# Patient Record
Sex: MALE | Race: WHITE | NOT HISPANIC OR LATINO | Employment: OTHER | URBAN - METROPOLITAN AREA
[De-identification: names, ages, dates, MRNs, and addresses within clinical notes are randomized per-mention and may not be internally consistent; named-entity substitution may affect disease eponyms.]

---

## 2018-07-26 ENCOUNTER — OFFICE VISIT (OUTPATIENT)
Dept: PRIMARY CARE CLINIC | Facility: CLINIC | Age: 38
End: 2018-07-26
Payer: MEDICAID

## 2018-07-26 ENCOUNTER — CLINICAL SUPPORT (OUTPATIENT)
Dept: PRIMARY CARE CLINIC | Facility: CLINIC | Age: 38
End: 2018-07-26
Payer: MEDICAID

## 2018-07-26 VITALS
BODY MASS INDEX: 30.82 KG/M2 | RESPIRATION RATE: 18 BRPM | TEMPERATURE: 98 F | OXYGEN SATURATION: 96 % | DIASTOLIC BLOOD PRESSURE: 77 MMHG | HEIGHT: 70 IN | WEIGHT: 215.31 LBS | HEART RATE: 72 BPM | SYSTOLIC BLOOD PRESSURE: 121 MMHG

## 2018-07-26 DIAGNOSIS — M25.522 LEFT ELBOW PAIN: Primary | ICD-10-CM

## 2018-07-26 DIAGNOSIS — Z13.220 LIPID SCREENING: ICD-10-CM

## 2018-07-26 DIAGNOSIS — M25.522 LEFT ELBOW PAIN: ICD-10-CM

## 2018-07-26 DIAGNOSIS — M10.9 ACUTE GOUT OF LEFT ELBOW, UNSPECIFIED CAUSE: ICD-10-CM

## 2018-07-26 LAB
ALBUMIN SERPL BCP-MCNC: 4.6 G/DL
ALP SERPL-CCNC: 58 U/L
ALT SERPL W/O P-5'-P-CCNC: 33 U/L
ANION GAP SERPL CALC-SCNC: 9 MMOL/L
AST SERPL-CCNC: 27 U/L
BASOPHILS # BLD AUTO: 0 K/UL
BASOPHILS NFR BLD: 0.5 %
BILIRUB SERPL-MCNC: 0.5 MG/DL
BUN SERPL-MCNC: 17 MG/DL
CALCIUM SERPL-MCNC: 9.1 MG/DL
CHLORIDE SERPL-SCNC: 99 MMOL/L
CHOLEST SERPL-MCNC: 170 MG/DL
CHOLEST/HDLC SERPL: 3.7 {RATIO}
CO2 SERPL-SCNC: 26 MMOL/L
CREAT SERPL-MCNC: 1 MG/DL
DIFFERENTIAL METHOD: ABNORMAL
EOSINOPHIL # BLD AUTO: 0.1 K/UL
EOSINOPHIL NFR BLD: 1.9 %
ERYTHROCYTE [DISTWIDTH] IN BLOOD BY AUTOMATED COUNT: 13 %
EST. GFR  (AFRICAN AMERICAN): >60 ML/MIN/1.73 M^2
EST. GFR  (NON AFRICAN AMERICAN): >60 ML/MIN/1.73 M^2
GLUCOSE SERPL-MCNC: 87 MG/DL
HCT VFR BLD AUTO: 46.2 %
HDLC SERPL-MCNC: 46 MG/DL
HDLC SERPL: 27.1 %
HGB BLD-MCNC: 15.7 G/DL
LDLC SERPL CALC-MCNC: 92 MG/DL
LYMPHOCYTES # BLD AUTO: 2.3 K/UL
LYMPHOCYTES NFR BLD: 33 %
MCH RBC QN AUTO: 28.4 PG
MCHC RBC AUTO-ENTMCNC: 34 G/DL
MCV RBC AUTO: 83 FL
MONOCYTES # BLD AUTO: 0.6 K/UL
MONOCYTES NFR BLD: 8.5 %
NEUTROPHILS # BLD AUTO: 3.9 K/UL
NEUTROPHILS NFR BLD: 56.1 %
NONHDLC SERPL-MCNC: 124 MG/DL
PLATELET # BLD AUTO: 263 K/UL
PMV BLD AUTO: 7.8 FL
POTASSIUM SERPL-SCNC: 3.9 MMOL/L
PROT SERPL-MCNC: 8.2 G/DL
RBC # BLD AUTO: 5.54 M/UL
SODIUM SERPL-SCNC: 134 MMOL/L
TRIGL SERPL-MCNC: 162 MG/DL
URATE SERPL-MCNC: 6.1 MG/DL
WBC # BLD AUTO: 6.9 K/UL

## 2018-07-26 PROCEDURE — 99212 OFFICE O/P EST SF 10 MIN: CPT | Mod: PBBFAC,27,PN

## 2018-07-26 PROCEDURE — 99999 PR PBB SHADOW E&M-EST. PATIENT-LVL II: CPT | Mod: PBBFAC,,,

## 2018-07-26 PROCEDURE — 99214 OFFICE O/P EST MOD 30 MIN: CPT | Mod: S$PBB,,, | Performed by: NURSE PRACTITIONER

## 2018-07-26 PROCEDURE — 99203 OFFICE O/P NEW LOW 30 MIN: CPT | Mod: PBBFAC,PN | Performed by: NURSE PRACTITIONER

## 2018-07-26 PROCEDURE — 99999 PR PBB SHADOW E&M-NEW PATIENT-LVL III: CPT | Mod: PBBFAC,,, | Performed by: NURSE PRACTITIONER

## 2018-07-26 RX ORDER — METHYLPREDNISOLONE 4 MG/1
TABLET ORAL
Qty: 1 PACKAGE | Refills: 0 | Status: SHIPPED | OUTPATIENT
Start: 2018-07-26 | End: 2018-09-25

## 2018-07-26 RX ORDER — INDOMETHACIN 25 MG/1
25 CAPSULE ORAL 3 TIMES DAILY
Qty: 28 CAPSULE | Refills: 0 | Status: SHIPPED | OUTPATIENT
Start: 2018-07-26 | End: 2018-09-25

## 2018-07-26 NOTE — PROGRESS NOTES
Chief Complaint  Chief Complaint   Patient presents with    Elbow Pain       HPI  Varun Graham Sr. is a 38 y.o. male with multiple medical diagnoses as listed in the medical history and problem list that presents for left elbow pain.    Patient reports onset of left elbow pain approximately 24 hr ago.  Describes elbow has tender, warm, erythematous, swollen.  Improving in pain and swelling over the last 24 hr.  Pain rated 10/10.  Increased pain with.  No difficulty with movement and range of motion.  Patient without a history of gout or joint abnormalities.  No associated trauma or fall.  Patient is in smoker.  No alcohol intake.  No medical history surgical history.  Not treating with any over-the-counter medications.  No recent lab work.  Patient due for lab work at this time.    PAST MEDICAL HISTORY:  History reviewed. No pertinent past medical history.    PAST SURGICAL HISTORY:  Past Surgical History:   Procedure Laterality Date    APPENDECTOMY         SOCIAL HISTORY:  Social History     Social History    Marital status:      Spouse name: N/A    Number of children: N/A    Years of education: N/A     Occupational History    Not on file.     Social History Main Topics    Smoking status: Current Every Day Smoker    Smokeless tobacco: Never Used    Alcohol use No    Drug use: No    Sexual activity: Yes     Other Topics Concern    Not on file     Social History Narrative    No narrative on file       FAMILY HISTORY:  Family History   Problem Relation Age of Onset    Family history unknown: Yes       ALLERGIES AND MEDICATIONS: updated and reviewed.  Review of patient's allergies indicates:  No Known Allergies  Current Outpatient Prescriptions   Medication Sig Dispense Refill    indomethacin (INDOCIN) 25 MG capsule Take 1 capsule (25 mg total) by mouth 3 (three) times daily. 28 capsule 0    methylPREDNISolone (MEDROL DOSEPACK) 4 mg tablet use as directed 1 Package 0     No current  "facility-administered medications for this visit.          ROS  Review of Systems   Constitutional: Negative for chills, fatigue and fever.   HENT: Negative for congestion, rhinorrhea, sinus pressure and sore throat.    Respiratory: Negative for cough, chest tightness and shortness of breath.    Cardiovascular: Negative for chest pain and palpitations.   Gastrointestinal: Negative for abdominal pain, blood in stool, diarrhea, nausea and vomiting.   Genitourinary: Negative for dysuria, frequency, hematuria and urgency.   Musculoskeletal: Positive for arthralgias and joint swelling. Negative for back pain.   Skin: Negative for rash and wound.   Neurological: Negative for dizziness and headaches.   Psychiatric/Behavioral: Negative for dysphoric mood and sleep disturbance. The patient is not nervous/anxious.          PHYSICAL EXAM  Vitals:    07/26/18 1323   BP: 121/77   BP Location: Right arm   Patient Position: Sitting   BP Method: Medium (Automatic)   Pulse: 72   Resp: 18   Temp: 98.2 °F (36.8 °C)   TempSrc: Oral   SpO2: 96%   Weight: 97.7 kg (215 lb 4.8 oz)   Height: 5' 10" (1.778 m)    Body mass index is 30.89 kg/m².  Weight: 97.7 kg (215 lb 4.8 oz)   Height: 5' 10" (177.8 cm)     Physical Exam   Constitutional: He is oriented to person, place, and time. He appears well-developed and well-nourished.   HENT:   Head: Normocephalic.   Right Ear: Tympanic membrane normal.   Left Ear: Tympanic membrane normal.   Mouth/Throat: Uvula is midline, oropharynx is clear and moist and mucous membranes are normal.   Eyes: Conjunctivae are normal.   Cardiovascular: Normal rate, regular rhythm, normal heart sounds and normal pulses.    No murmur heard.  Pulses:       Radial pulses are 2+ on the right side, and 2+ on the left side.   No LE swelling noted   Pulmonary/Chest: Effort normal and breath sounds normal. He has no wheezes.   Abdominal: Soft. Bowel sounds are normal. There is no tenderness.   Musculoskeletal: He exhibits no " edema.        Arms:  Lymphadenopathy:     He has no cervical adenopathy.   Neurological: He is alert and oriented to person, place, and time.   Skin: Skin is warm and dry. No rash noted.   Psychiatric: He has a normal mood and affect.         Health Maintenance    Patient has no pending health maintenance at this time           Assessment & Plan    Varun was seen today for elbow pain.    Diagnoses and all orders for this visit:    Left elbow pain  -     Uric acid; Future    Acute gout of left elbow, unspecified cause  -     CBC auto differential; Future  -     Comprehensive metabolic panel; Future    Lipid screening  -     Lipid panel; Future    Other orders  -     methylPREDNISolone (MEDROL DOSEPACK) 4 mg tablet; use as directed  -     indomethacin (INDOCIN) 25 MG capsule; Take 1 capsule (25 mg total) by mouth 3 (three) times daily.        Follow-up: Follow-up in about 3 months (around 10/26/2018).

## 2018-08-07 ENCOUNTER — TELEPHONE (OUTPATIENT)
Dept: PRIMARY CARE CLINIC | Facility: CLINIC | Age: 38
End: 2018-08-07

## 2018-08-07 NOTE — TELEPHONE ENCOUNTER
----- Message from Cinthia Marley sent at 8/7/2018  2:16 PM CDT -----  Test result.  Please call patient at [phone].

## 2018-09-25 ENCOUNTER — OFFICE VISIT (OUTPATIENT)
Dept: PRIMARY CARE CLINIC | Facility: CLINIC | Age: 38
End: 2018-09-25
Payer: MEDICAID

## 2018-09-25 VITALS
RESPIRATION RATE: 18 BRPM | BODY MASS INDEX: 30.8 KG/M2 | WEIGHT: 215.13 LBS | SYSTOLIC BLOOD PRESSURE: 124 MMHG | TEMPERATURE: 99 F | HEIGHT: 70 IN | DIASTOLIC BLOOD PRESSURE: 78 MMHG | HEART RATE: 82 BPM | OXYGEN SATURATION: 98 %

## 2018-09-25 DIAGNOSIS — L73.8 FOLLICULITIS BARBAE: Primary | ICD-10-CM

## 2018-09-25 PROCEDURE — 99214 OFFICE O/P EST MOD 30 MIN: CPT | Mod: S$PBB,,, | Performed by: NURSE PRACTITIONER

## 2018-09-25 PROCEDURE — 99999 PR PBB SHADOW E&M-EST. PATIENT-LVL III: CPT | Mod: PBBFAC,,, | Performed by: NURSE PRACTITIONER

## 2018-09-25 PROCEDURE — 99213 OFFICE O/P EST LOW 20 MIN: CPT | Mod: PBBFAC,PN | Performed by: NURSE PRACTITIONER

## 2018-09-25 RX ORDER — SULFAMETHOXAZOLE AND TRIMETHOPRIM 800; 160 MG/1; MG/1
1 TABLET ORAL 2 TIMES DAILY
Qty: 14 TABLET | Refills: 0 | Status: SHIPPED | OUTPATIENT
Start: 2018-09-25 | End: 2018-10-02

## 2018-09-25 NOTE — PROGRESS NOTES
Chief Complaint  Chief Complaint   Patient presents with    Sore Throat    Otalgia     right ear with lump       HPI  Varun Graham Sr. is a 38 y.o. male with multiple medical diagnoses as listed in the medical history and problem list that presents for right ear pain.    Patient reports that he was shaving approximately 7 days ago when he cut him off of his face using a razor.  Noted right facial pain, swelling immediately after.  Associated right ear pain. Noted swelling and Knot noted to his right face.  Describes tender, painful, increasing in size.  No fever or chills.  Not currently taking any over-the-counter medications for relief.      PAST MEDICAL HISTORY:  History reviewed. No pertinent past medical history.    PAST SURGICAL HISTORY:  Past Surgical History:   Procedure Laterality Date    APPENDECTOMY         SOCIAL HISTORY:  Social History     Socioeconomic History    Marital status:      Spouse name: Not on file    Number of children: Not on file    Years of education: Not on file    Highest education level: Not on file   Social Needs    Financial resource strain: Not on file    Food insecurity - worry: Not on file    Food insecurity - inability: Not on file    Transportation needs - medical: Not on file    Transportation needs - non-medical: Not on file   Occupational History    Not on file   Tobacco Use    Smoking status: Current Every Day Smoker     Packs/day: 1.00     Types: Cigarettes    Smokeless tobacco: Never Used   Substance and Sexual Activity    Alcohol use: No    Drug use: No    Sexual activity: Yes   Other Topics Concern    Not on file   Social History Narrative    Not on file       FAMILY HISTORY:  Family History   Family history unknown: Yes       ALLERGIES AND MEDICATIONS: updated and reviewed.  Review of patient's allergies indicates:  No Known Allergies  Current Outpatient Medications   Medication Sig Dispense Refill    sulfamethoxazole-trimethoprim  "800-160mg (BACTRIM DS) 800-160 mg Tab Take 1 tablet by mouth 2 (two) times daily. for 7 days 14 tablet 0     No current facility-administered medications for this visit.          ROS  Review of Systems   Constitutional: Negative for chills, fatigue and fever.   HENT: Positive for ear pain and facial swelling. Negative for congestion, rhinorrhea, sinus pressure and sore throat.    Respiratory: Negative for cough, chest tightness and shortness of breath.    Cardiovascular: Negative for chest pain and palpitations.   Gastrointestinal: Negative for abdominal pain, blood in stool, diarrhea, nausea and vomiting.   Genitourinary: Negative for dysuria, frequency, hematuria and urgency.   Musculoskeletal: Negative for arthralgias and back pain.   Skin: Positive for wound. Negative for rash.   Neurological: Negative for dizziness and headaches.   Psychiatric/Behavioral: Negative for dysphoric mood and sleep disturbance. The patient is not nervous/anxious.          PHYSICAL EXAM  Vitals:    09/25/18 1019   BP: 124/78   BP Location: Right arm   Patient Position: Sitting   BP Method: Large (Automatic)   Pulse: 82   Resp: 18   Temp: 98.6 °F (37 °C)   TempSrc: Oral   SpO2: 98%   Weight: 97.6 kg (215 lb 1.6 oz)   Height: 5' 10" (1.778 m)    Body mass index is 30.86 kg/m².  Weight: 97.6 kg (215 lb 1.6 oz)   Height: 5' 10" (177.8 cm)     Physical Exam   Constitutional: He is oriented to person, place, and time. He appears well-developed and well-nourished.   HENT:   Head: Normocephalic.       Right Ear: Tympanic membrane normal.   Left Ear: Tympanic membrane normal.   Mouth/Throat: Uvula is midline, oropharynx is clear and moist and mucous membranes are normal.   Eyes: Conjunctivae are normal.   Cardiovascular: Normal rate, regular rhythm, normal heart sounds and normal pulses.   No murmur heard.  Pulses:       Radial pulses are 2+ on the right side, and 2+ on the left side.   No LE swelling noted   Pulmonary/Chest: Effort normal and " breath sounds normal. He has no wheezes.   Abdominal: Soft. Bowel sounds are normal. There is no tenderness.   Musculoskeletal: He exhibits no edema.   Lymphadenopathy:     He has no cervical adenopathy.   Neurological: He is alert and oriented to person, place, and time.   Skin: Skin is warm and dry. No rash noted.   Psychiatric: He has a normal mood and affect.         Health Maintenance       Date Due Completion Date    TETANUS VACCINE 01/14/1996 ---    Pneumococcal PPSV23 (Medium Risk) (1) 01/14/1996 ---    Influenza Vaccine 08/01/2018 ---    Lipid Panel 07/26/2023 7/26/2018            Assessment & Plan    Varun was seen today for sore throat and otalgia.    Diagnoses and all orders for this visit:    Folliculitis barbae  -     sulfamethoxazole-trimethoprim 800-160mg (BACTRIM DS) 800-160 mg Tab; Take 1 tablet by mouth 2 (two) times daily. for 7 days        Follow-up: Follow-up if symptoms worsen or fail to improve.

## 2021-04-01 ENCOUNTER — OCCUPATIONAL HEALTH (OUTPATIENT)
Dept: URGENT CARE | Facility: CLINIC | Age: 41
End: 2021-04-01

## 2021-04-01 PROCEDURE — 80305 DRUG TEST PRSMV DIR OPT OBS: CPT | Mod: S$GLB,,, | Performed by: EMERGENCY MEDICINE

## 2021-04-01 PROCEDURE — 80305 PR NON-DOT DRUG SCREENS: ICD-10-PCS | Mod: S$GLB,,, | Performed by: EMERGENCY MEDICINE

## 2021-12-15 ENCOUNTER — OFFICE VISIT (OUTPATIENT)
Dept: PRIMARY CARE CLINIC | Facility: CLINIC | Age: 41
End: 2021-12-15
Payer: COMMERCIAL

## 2021-12-15 VITALS
RESPIRATION RATE: 16 BRPM | HEIGHT: 70 IN | TEMPERATURE: 98 F | OXYGEN SATURATION: 97 % | HEART RATE: 75 BPM | SYSTOLIC BLOOD PRESSURE: 144 MMHG | DIASTOLIC BLOOD PRESSURE: 100 MMHG | BODY MASS INDEX: 30.27 KG/M2 | WEIGHT: 211.44 LBS

## 2021-12-15 DIAGNOSIS — Z11.4 ENCOUNTER FOR SCREENING FOR HIV: ICD-10-CM

## 2021-12-15 DIAGNOSIS — M54.16 LUMBAR BACK PAIN WITH RADICULOPATHY AFFECTING LEFT LOWER EXTREMITY: ICD-10-CM

## 2021-12-15 DIAGNOSIS — Z76.89 ENCOUNTER TO ESTABLISH CARE: Primary | ICD-10-CM

## 2021-12-15 DIAGNOSIS — Z11.59 NEED FOR HEPATITIS C SCREENING TEST: ICD-10-CM

## 2021-12-15 DIAGNOSIS — R03.0 ELEVATED BLOOD PRESSURE READING: ICD-10-CM

## 2021-12-15 DIAGNOSIS — Z13.6 SCREENING FOR CARDIOVASCULAR CONDITION: ICD-10-CM

## 2021-12-15 PROCEDURE — 99204 OFFICE O/P NEW MOD 45 MIN: CPT | Mod: S$GLB,,, | Performed by: STUDENT IN AN ORGANIZED HEALTH CARE EDUCATION/TRAINING PROGRAM

## 2021-12-15 PROCEDURE — 99999 PR PBB SHADOW E&M-EST. PATIENT-LVL IV: ICD-10-PCS | Mod: PBBFAC,,, | Performed by: STUDENT IN AN ORGANIZED HEALTH CARE EDUCATION/TRAINING PROGRAM

## 2021-12-15 PROCEDURE — 99999 PR PBB SHADOW E&M-EST. PATIENT-LVL IV: CPT | Mod: PBBFAC,,, | Performed by: STUDENT IN AN ORGANIZED HEALTH CARE EDUCATION/TRAINING PROGRAM

## 2021-12-15 PROCEDURE — 99204 PR OFFICE/OUTPT VISIT, NEW, LEVL IV, 45-59 MIN: ICD-10-PCS | Mod: S$GLB,,, | Performed by: STUDENT IN AN ORGANIZED HEALTH CARE EDUCATION/TRAINING PROGRAM

## 2021-12-15 RX ORDER — MELOXICAM 15 MG/1
15 TABLET ORAL DAILY
Qty: 30 TABLET | Refills: 1 | Status: ON HOLD | OUTPATIENT
Start: 2021-12-15 | End: 2022-01-27 | Stop reason: HOSPADM

## 2021-12-15 RX ORDER — PREDNISONE 20 MG/1
20 TABLET ORAL DAILY
Qty: 5 TABLET | Refills: 0 | Status: ON HOLD | OUTPATIENT
Start: 2021-12-15 | End: 2022-01-27 | Stop reason: HOSPADM

## 2021-12-15 RX ORDER — CYCLOBENZAPRINE HCL 5 MG
5-10 TABLET ORAL 3 TIMES DAILY PRN
Qty: 40 TABLET | Refills: 0 | Status: SHIPPED | OUTPATIENT
Start: 2021-12-15 | End: 2021-12-25

## 2021-12-15 RX ORDER — HYDROCODONE BITARTRATE AND ACETAMINOPHEN 5; 325 MG/1; MG/1
1 TABLET ORAL EVERY 8 HOURS PRN
Qty: 15 TABLET | Refills: 0 | Status: ON HOLD | OUTPATIENT
Start: 2021-12-15 | End: 2022-01-27 | Stop reason: HOSPADM

## 2021-12-22 ENCOUNTER — OFFICE VISIT (OUTPATIENT)
Dept: PRIMARY CARE CLINIC | Facility: CLINIC | Age: 41
End: 2021-12-22
Payer: COMMERCIAL

## 2021-12-22 VITALS
SYSTOLIC BLOOD PRESSURE: 174 MMHG | DIASTOLIC BLOOD PRESSURE: 101 MMHG | BODY MASS INDEX: 30.98 KG/M2 | WEIGHT: 216.38 LBS | HEART RATE: 97 BPM | OXYGEN SATURATION: 99 % | RESPIRATION RATE: 18 BRPM | HEIGHT: 70 IN

## 2021-12-22 DIAGNOSIS — M54.16 LUMBAR RADICULOPATHY: Primary | ICD-10-CM

## 2021-12-22 PROCEDURE — 3077F PR MOST RECENT SYSTOLIC BLOOD PRESSURE >= 140 MM HG: ICD-10-PCS | Mod: CPTII,S$GLB,, | Performed by: NURSE PRACTITIONER

## 2021-12-22 PROCEDURE — 99214 PR OFFICE/OUTPT VISIT, EST, LEVL IV, 30-39 MIN: ICD-10-PCS | Mod: 25,S$GLB,, | Performed by: NURSE PRACTITIONER

## 2021-12-22 PROCEDURE — 99999 PR PBB SHADOW E&M-EST. PATIENT-LVL IV: CPT | Mod: PBBFAC,,, | Performed by: NURSE PRACTITIONER

## 2021-12-22 PROCEDURE — 3008F PR BODY MASS INDEX (BMI) DOCUMENTED: ICD-10-PCS | Mod: CPTII,S$GLB,, | Performed by: NURSE PRACTITIONER

## 2021-12-22 PROCEDURE — 3077F SYST BP >= 140 MM HG: CPT | Mod: CPTII,S$GLB,, | Performed by: NURSE PRACTITIONER

## 2021-12-22 PROCEDURE — 96372 PR INJECTION,THERAP/PROPH/DIAG2ST, IM OR SUBCUT: ICD-10-PCS | Mod: S$GLB,,, | Performed by: NURSE PRACTITIONER

## 2021-12-22 PROCEDURE — 99999 PR PBB SHADOW E&M-EST. PATIENT-LVL IV: ICD-10-PCS | Mod: PBBFAC,,, | Performed by: NURSE PRACTITIONER

## 2021-12-22 PROCEDURE — 1160F RVW MEDS BY RX/DR IN RCRD: CPT | Mod: CPTII,S$GLB,, | Performed by: NURSE PRACTITIONER

## 2021-12-22 PROCEDURE — 3080F DIAST BP >= 90 MM HG: CPT | Mod: CPTII,S$GLB,, | Performed by: NURSE PRACTITIONER

## 2021-12-22 PROCEDURE — 1159F MED LIST DOCD IN RCRD: CPT | Mod: CPTII,S$GLB,, | Performed by: NURSE PRACTITIONER

## 2021-12-22 PROCEDURE — 3008F BODY MASS INDEX DOCD: CPT | Mod: CPTII,S$GLB,, | Performed by: NURSE PRACTITIONER

## 2021-12-22 PROCEDURE — 1159F PR MEDICATION LIST DOCUMENTED IN MEDICAL RECORD: ICD-10-PCS | Mod: CPTII,S$GLB,, | Performed by: NURSE PRACTITIONER

## 2021-12-22 PROCEDURE — 1160F PR REVIEW ALL MEDS BY PRESCRIBER/CLIN PHARMACIST DOCUMENTED: ICD-10-PCS | Mod: CPTII,S$GLB,, | Performed by: NURSE PRACTITIONER

## 2021-12-22 PROCEDURE — 3080F PR MOST RECENT DIASTOLIC BLOOD PRESSURE >= 90 MM HG: ICD-10-PCS | Mod: CPTII,S$GLB,, | Performed by: NURSE PRACTITIONER

## 2021-12-22 PROCEDURE — 99214 OFFICE O/P EST MOD 30 MIN: CPT | Mod: 25,S$GLB,, | Performed by: NURSE PRACTITIONER

## 2021-12-22 PROCEDURE — 96372 THER/PROPH/DIAG INJ SC/IM: CPT | Mod: S$GLB,,, | Performed by: NURSE PRACTITIONER

## 2021-12-22 RX ORDER — METHOCARBAMOL 500 MG/1
500 TABLET, FILM COATED ORAL 4 TIMES DAILY
Qty: 40 TABLET | Refills: 0 | Status: SHIPPED | OUTPATIENT
Start: 2021-12-22 | End: 2022-01-01

## 2021-12-22 RX ORDER — BETAMETHASONE SODIUM PHOSPHATE AND BETAMETHASONE ACETATE 3; 3 MG/ML; MG/ML
12 INJECTION, SUSPENSION INTRA-ARTICULAR; INTRALESIONAL; INTRAMUSCULAR; SOFT TISSUE
Status: COMPLETED | OUTPATIENT
Start: 2021-12-22 | End: 2021-12-22

## 2021-12-22 RX ORDER — GABAPENTIN 300 MG/1
300 CAPSULE ORAL 3 TIMES DAILY
Qty: 30 CAPSULE | Refills: 1 | Status: ON HOLD | OUTPATIENT
Start: 2021-12-22 | End: 2022-01-27 | Stop reason: SDUPTHER

## 2021-12-22 RX ADMIN — BETAMETHASONE SODIUM PHOSPHATE AND BETAMETHASONE ACETATE 12 MG: 3; 3 INJECTION, SUSPENSION INTRA-ARTICULAR; INTRALESIONAL; INTRAMUSCULAR; SOFT TISSUE at 10:12

## 2021-12-27 ENCOUNTER — PATIENT MESSAGE (OUTPATIENT)
Dept: PRIMARY CARE CLINIC | Facility: CLINIC | Age: 41
End: 2021-12-27

## 2021-12-27 ENCOUNTER — TELEPHONE (OUTPATIENT)
Dept: PRIMARY CARE CLINIC | Facility: CLINIC | Age: 41
End: 2021-12-27
Payer: COMMERCIAL

## 2021-12-27 ENCOUNTER — CLINICAL SUPPORT (OUTPATIENT)
Dept: PRIMARY CARE CLINIC | Facility: CLINIC | Age: 41
End: 2021-12-27
Payer: COMMERCIAL

## 2021-12-27 ENCOUNTER — NURSE TRIAGE (OUTPATIENT)
Dept: ADMINISTRATIVE | Facility: CLINIC | Age: 41
End: 2021-12-27
Payer: COMMERCIAL

## 2021-12-27 VITALS — SYSTOLIC BLOOD PRESSURE: 182 MMHG | HEART RATE: 123 BPM | OXYGEN SATURATION: 98 % | DIASTOLIC BLOOD PRESSURE: 104 MMHG

## 2021-12-27 DIAGNOSIS — I16.1 HYPERTENSIVE EMERGENCY: Primary | ICD-10-CM

## 2021-12-27 PROCEDURE — 99999 PR PBB SHADOW E&M-EST. PATIENT-LVL II: ICD-10-PCS | Mod: PBBFAC,,,

## 2021-12-27 PROCEDURE — 99999 PR PBB SHADOW E&M-EST. PATIENT-LVL II: CPT | Mod: PBBFAC,,,

## 2021-12-30 ENCOUNTER — PATIENT OUTREACH (OUTPATIENT)
Dept: ADMINISTRATIVE | Facility: OTHER | Age: 41
End: 2021-12-30
Payer: COMMERCIAL

## 2022-01-03 ENCOUNTER — OFFICE VISIT (OUTPATIENT)
Dept: SPINE | Facility: CLINIC | Age: 42
End: 2022-01-03
Payer: COMMERCIAL

## 2022-01-03 VITALS
BODY MASS INDEX: 29.95 KG/M2 | HEART RATE: 90 BPM | SYSTOLIC BLOOD PRESSURE: 173 MMHG | DIASTOLIC BLOOD PRESSURE: 92 MMHG | WEIGHT: 209.19 LBS | HEIGHT: 70 IN

## 2022-01-03 DIAGNOSIS — M54.16 LUMBAR RADICULOPATHY: ICD-10-CM

## 2022-01-03 DIAGNOSIS — M54.42 ACUTE LEFT-SIDED LOW BACK PAIN WITH LEFT-SIDED SCIATICA: ICD-10-CM

## 2022-01-03 DIAGNOSIS — M47.26 OTHER SPONDYLOSIS WITH RADICULOPATHY, LUMBAR REGION: Primary | ICD-10-CM

## 2022-01-03 PROCEDURE — 1160F PR REVIEW ALL MEDS BY PRESCRIBER/CLIN PHARMACIST DOCUMENTED: ICD-10-PCS | Mod: CPTII,S$GLB,, | Performed by: NURSE PRACTITIONER

## 2022-01-03 PROCEDURE — 1159F PR MEDICATION LIST DOCUMENTED IN MEDICAL RECORD: ICD-10-PCS | Mod: CPTII,S$GLB,, | Performed by: NURSE PRACTITIONER

## 2022-01-03 PROCEDURE — 99204 PR OFFICE/OUTPT VISIT, NEW, LEVL IV, 45-59 MIN: ICD-10-PCS | Mod: S$GLB,,, | Performed by: NURSE PRACTITIONER

## 2022-01-03 PROCEDURE — 99999 PR PBB SHADOW E&M-EST. PATIENT-LVL IV: ICD-10-PCS | Mod: PBBFAC,,, | Performed by: NURSE PRACTITIONER

## 2022-01-03 PROCEDURE — 3077F PR MOST RECENT SYSTOLIC BLOOD PRESSURE >= 140 MM HG: ICD-10-PCS | Mod: CPTII,S$GLB,, | Performed by: NURSE PRACTITIONER

## 2022-01-03 PROCEDURE — 1159F MED LIST DOCD IN RCRD: CPT | Mod: CPTII,S$GLB,, | Performed by: NURSE PRACTITIONER

## 2022-01-03 PROCEDURE — 99204 OFFICE O/P NEW MOD 45 MIN: CPT | Mod: S$GLB,,, | Performed by: NURSE PRACTITIONER

## 2022-01-03 PROCEDURE — 1160F RVW MEDS BY RX/DR IN RCRD: CPT | Mod: CPTII,S$GLB,, | Performed by: NURSE PRACTITIONER

## 2022-01-03 PROCEDURE — 3080F DIAST BP >= 90 MM HG: CPT | Mod: CPTII,S$GLB,, | Performed by: NURSE PRACTITIONER

## 2022-01-03 PROCEDURE — 3008F BODY MASS INDEX DOCD: CPT | Mod: CPTII,S$GLB,, | Performed by: NURSE PRACTITIONER

## 2022-01-03 PROCEDURE — 3080F PR MOST RECENT DIASTOLIC BLOOD PRESSURE >= 90 MM HG: ICD-10-PCS | Mod: CPTII,S$GLB,, | Performed by: NURSE PRACTITIONER

## 2022-01-03 PROCEDURE — 3008F PR BODY MASS INDEX (BMI) DOCUMENTED: ICD-10-PCS | Mod: CPTII,S$GLB,, | Performed by: NURSE PRACTITIONER

## 2022-01-03 PROCEDURE — 3077F SYST BP >= 140 MM HG: CPT | Mod: CPTII,S$GLB,, | Performed by: NURSE PRACTITIONER

## 2022-01-03 PROCEDURE — 99999 PR PBB SHADOW E&M-EST. PATIENT-LVL IV: CPT | Mod: PBBFAC,,, | Performed by: NURSE PRACTITIONER

## 2022-01-03 NOTE — PROGRESS NOTES
Subjective:      Patient ID: Varun Graham Sr. is a 41 y.o. male.    Chief Complaint: Tailbone Pain    HPI Mr. Graham is a 41 year old male here for evaluation of low back pain. He was referred by Gudelia Correa NP for consultation. Patient reports his back pain started around Thanksgiving when he pulled on the kitchen table. The pain has been constant over the left low back and radiates down the posterior LLE to the ankle. He reports numbness and tingling in the left foot. He reports weakness in the left leg. The pain worsens with sitting and improves with nothing. He has tried gabapentin, robaxin, zanaflex, percocet and prednisone with limited benefit. He reports similar pain in the past, but states it has never been this severe. He has not done PT for his back in the past. The current episode is severe and affecting his functional activities. He denies loss of bladder/bowel function or saddle anesthesia. Pain now 8/10.     Lumbar xray 2021    FINDINGS:  Three views lumbar spine.     Lateral imaging demonstrates adequate alignment of the lumbar spine without significant vertebral body height loss or disc space height loss.  The sacral segments are aligned.  AP spinal alignment is remarkable for dextroscoliotic curvature.  The bilateral sacroiliac joints are intact.     Impression:     1. No acute displaced fracture or dislocation of the lumbar spine.    No past medical history on file.    Past Surgical History:   Procedure Laterality Date    APPENDECTOMY  1996       Family History   Problem Relation Age of Onset    Diabetes Father     Heart attack Paternal Grandfather 67    Stroke Neg Hx     Colon cancer Neg Hx     Lung cancer Neg Hx     Prostate cancer Neg Hx        Social History     Socioeconomic History    Marital status:    Tobacco Use    Smoking status: Current Every Day Smoker     Packs/day: 1.00     Types: Cigarettes    Smokeless tobacco: Never Used   Substance and Sexual Activity     Alcohol use: No    Drug use: No    Sexual activity: Yes     Partners: Female       Current Outpatient Medications   Medication Sig Dispense Refill    amLODIPine (NORVASC) 5 MG tablet Take 0.5 tablets (2.5 mg total) by mouth once daily. 15 tablet 0    gabapentin (NEURONTIN) 300 MG capsule Take 1 capsule (300 mg total) by mouth 3 (three) times daily. 30 capsule 1    HYDROcodone-acetaminophen (NORCO) 5-325 mg per tablet Take 1 tablet by mouth every 8 (eight) hours as needed (breakthrough pain). 15 tablet 0    ketorolac (TORADOL) 10 mg tablet Take 1 tablet (10 mg total) by mouth every 6 (six) hours as needed for Pain. Do not take with other NSAIDs such as meloxicam, ibuprofen, or naproxen 12 tablet 0    LIDOcaine (LIDODERM) 5 % Place 1 patch onto the skin once daily. Remove & Discard patch within 12 hours or as directed by MD 30 patch 0    meloxicam (MOBIC) 15 MG tablet Take 1 tablet (15 mg total) by mouth once daily. 30 tablet 1    oxyCODONE-acetaminophen (PERCOCET) 5-325 mg per tablet Take 1 tablet by mouth every 4 (four) hours as needed for Pain. 15 tablet 0    predniSONE (DELTASONE) 20 MG tablet Take 1 tablet (20 mg total) by mouth once daily. 5 tablet 0     No current facility-administered medications for this visit.       Review of patient's allergies indicates:  No Known Allergies      Review of Systems   Constitutional: Negative for fever, weight gain and weight loss.   Cardiovascular: Negative for chest pain.   Respiratory: Positive for shortness of breath (  when pain is severe).    Musculoskeletal: Positive for back pain (  left low back and left leg). Negative for falls.   Gastrointestinal: Negative for abdominal pain, bowel incontinence, nausea and vomiting.   Genitourinary: Negative for bladder incontinence.   Neurological: Positive for focal weakness (  LLE), numbness (  left foot) and paresthesias (  left foot). Negative for sensory change and weakness.         Objective:        General: Varun  is well-developed, well-nourished, appears stated age, in no acute distress, alert and oriented to time, place and person.     General    Vitals reviewed.  Constitutional: He is oriented to person, place, and time. He appears well-developed and well-nourished.   HENT:   Head: Atraumatic.   Nose: Nose normal.   Eyes: Conjunctivae are normal.   Cardiovascular: Normal rate.    Pulmonary/Chest: Effort normal.   Abdominal: He exhibits no distension.   Neurological: He is alert and oriented to person, place, and time.   Psychiatric: He has a normal mood and affect. His behavior is normal. Judgment and thought content normal.     General Musculoskeletal Exam   Gait: abnormal     Right Ankle/Foot Exam     Tests   Heel Walk: able to perform  Tiptoe Walk: able to perform    Left Ankle/Foot Exam     Tests   Heel Walk: able to perform  Tiptoe Walk: able to perform      Right Hip Exam     Tests   Pain w/ forced internal rotation (BRENNON): present (  left low back pain)  Left Hip Exam     Tests   Pain w/ forced internal rotation (BRENNON): present (  left low back pain)      Back (L-Spine & T-Spine) / Neck (C-Spine) Exam     Tenderness Left paramedian tenderness of the Lower L-Spine and Sacrum.     Back (L-Spine & T-Spine) Range of Motion   Extension: 30 (with pain)   Flexion: 70 (with pain)   Lateral bend right: 20   Lateral bend left: 20     Spinal Sensation   Right Side Sensation  L-Spine Level: normal  S-Spine Level: normal  Left Side Sensation  L-Spine Level: normal  S-Spine Level: S1 decreased    Other He has no scoliosis .  Spinal Kyphosis:  Absent    Comments:  (+) pain with facet loading L>R  (+) SLR on the left      Muscle Strength   Right Upper Extremity   Biceps: 5/5   Deltoid:  5/5  Triceps:  5/5  Left Upper Extremity  Biceps: 5/5   Deltoid:  5/5  Triceps:  5/5  Right Lower Extremity   Hip Flexion: 5/5   Quadriceps:  5/5   Ankle Dorsiflexion:  5/5   Anterior tibial:  5/5   EHL:  5/5  Left Lower Extremity   Hip Flexion:  4/5   Quadriceps:  5/5   Ankle Dorsiflexion:  5/5   Anterior tibial:  5/5   EHL:  5/5    Reflexes     Left Side  Achilles:  2+  Ankle Clonus:  absent  Quadriceps:  2+    Right Side   Achilles:  2+  Ankle Clonus:  absent  Quadriceps:  2+    Vascular Exam     Right Pulses        Carotid:                  2+    Left Pulses        Carotid:                  2+              Assessment:       1. Other spondylosis with radiculopathy, lumbar region    2. Acute left-sided low back pain with left-sided sciatica    3. Lumbar radiculopathy           Plan:          1. Prior records and imaging were reviewed today.   2. We discussed back pain and the nature of back pain.  We discussed that it is not one thing that causes the pain but an accumulation of multiple things that we do.  Much of his pain appears to be generated from possible disc herniation causing nerve root irritation.  The pain is severe and it is affecting his functional activities.  3. Order lumbar MRI to evaluate his persistent pain and radicular symptoms.  4. We discussed posture sitting and the importance of trying to sit better.    5. We discussed the benefits of therapy and exercise and continuing to move.  6. Referral for physical therapy to evaluate and treat low back and left leg pain.  7. He has tried gabapentin, Zanaflex, Robaxin, Percocet, and a trial of prednisone with limited benefit.  8. We discussed interventional procedures such as epidural injections to help with his pain pending MRI results.  9. Return for follow-up after MRI to review results and discuss further plan of care.      More than 50% of the total time  of 45 minutes was spent face to face in counseling on diagnosis and treatment options. I also counseled patient  on common and most usual side effect of prescribed medications.  I reviewed Primary care , and other specialty's notes to better coordinate patient's care. All questions were answered, and patient voiced understanding.        Follow-up: Follow up After MRI. If there are any questions prior to this, the patient was instructed to contact the office.

## 2022-01-07 ENCOUNTER — PATIENT MESSAGE (OUTPATIENT)
Dept: PRIMARY CARE CLINIC | Facility: CLINIC | Age: 42
End: 2022-01-07
Payer: COMMERCIAL

## 2022-01-07 ENCOUNTER — TELEPHONE (OUTPATIENT)
Dept: SPINE | Facility: CLINIC | Age: 42
End: 2022-01-07
Payer: COMMERCIAL

## 2022-01-07 ENCOUNTER — TELEPHONE (OUTPATIENT)
Dept: PRIMARY CARE CLINIC | Facility: CLINIC | Age: 42
End: 2022-01-07
Payer: COMMERCIAL

## 2022-01-07 DIAGNOSIS — I10 HYPERTENSION, UNSPECIFIED TYPE: Primary | ICD-10-CM

## 2022-01-07 RX ORDER — AMLODIPINE BESYLATE 5 MG/1
5 TABLET ORAL DAILY
Qty: 30 TABLET | Refills: 5 | Status: ON HOLD | OUTPATIENT
Start: 2022-01-07 | End: 2022-01-27 | Stop reason: HOSPADM

## 2022-01-07 RX ORDER — AMLODIPINE BESYLATE 5 MG/1
5 TABLET ORAL DAILY
Qty: 30 TABLET | Refills: 5 | Status: SHIPPED | OUTPATIENT
Start: 2022-01-07 | End: 2022-01-07

## 2022-01-07 NOTE — TELEPHONE ENCOUNTER
----- Message from Melissa Salas LPN sent at 1/5/2022  8:38 AM CST -----  Call pt or pt wife for BP readings.         Wife: [phone]

## 2022-01-07 NOTE — TELEPHONE ENCOUNTER
Spoke with pt, I notified him that his ins. Denied to cover the MRI due to not trying any physical Therapy for at least 6 weeks. Pt stated understanding and will schedule physical therapy.

## 2022-01-07 NOTE — TELEPHONE ENCOUNTER
Spoke with wife BP has been running high (151/118, 143/98) ER gave patient amlodipine 5 mg and he has been taking the whole 5 mg. He will be running out of BP meds soon as they only gave him 15 tablets

## 2022-01-22 ENCOUNTER — HOSPITAL ENCOUNTER (INPATIENT)
Facility: HOSPITAL | Age: 42
LOS: 2 days | Discharge: HOME | DRG: 551 | End: 2022-01-27
Attending: EMERGENCY MEDICINE | Admitting: EMERGENCY MEDICINE
Payer: COMMERCIAL

## 2022-01-22 DIAGNOSIS — M54.50 ACUTE MIDLINE LOW BACK PAIN, UNSPECIFIED WHETHER SCIATICA PRESENT: Primary | ICD-10-CM

## 2022-01-22 DIAGNOSIS — M54.16 LUMBAR RADICULOPATHY: ICD-10-CM

## 2022-01-22 DIAGNOSIS — U07.1 COVID: ICD-10-CM

## 2022-01-22 DIAGNOSIS — M47.26 OTHER SPONDYLOSIS WITH RADICULOPATHY, LUMBAR REGION: ICD-10-CM

## 2022-01-22 DIAGNOSIS — U07.1 COVID-19 VIRUS DETECTED: ICD-10-CM

## 2022-01-22 DIAGNOSIS — M54.9 BACK PAIN: ICD-10-CM

## 2022-01-22 LAB
ALBUMIN SERPL BCP-MCNC: 4.1 G/DL (ref 3.5–5.2)
ALP SERPL-CCNC: 60 U/L (ref 55–135)
ALT SERPL W/O P-5'-P-CCNC: 40 U/L (ref 10–44)
ANION GAP SERPL CALC-SCNC: 8 MMOL/L (ref 8–16)
AST SERPL-CCNC: 20 U/L (ref 10–40)
BASOPHILS # BLD AUTO: 0.02 K/UL (ref 0–0.2)
BASOPHILS NFR BLD: 0.2 % (ref 0–1.9)
BILIRUB SERPL-MCNC: 0.6 MG/DL (ref 0.1–1)
BUN SERPL-MCNC: 16 MG/DL (ref 6–20)
CALCIUM SERPL-MCNC: 9.3 MG/DL (ref 8.7–10.5)
CHLORIDE SERPL-SCNC: 103 MMOL/L (ref 95–110)
CO2 SERPL-SCNC: 26 MMOL/L (ref 23–29)
CREAT SERPL-MCNC: 0.8 MG/DL (ref 0.5–1.4)
DIFFERENTIAL METHOD: ABNORMAL
EOSINOPHIL # BLD AUTO: 0 K/UL (ref 0–0.5)
EOSINOPHIL NFR BLD: 0.2 % (ref 0–8)
ERYTHROCYTE [DISTWIDTH] IN BLOOD BY AUTOMATED COUNT: 11.9 % (ref 11.5–14.5)
EST. GFR  (AFRICAN AMERICAN): >60 ML/MIN/1.73 M^2
EST. GFR  (NON AFRICAN AMERICAN): >60 ML/MIN/1.73 M^2
GLUCOSE SERPL-MCNC: 101 MG/DL (ref 70–110)
HCT VFR BLD AUTO: 45.7 % (ref 40–54)
HGB BLD-MCNC: 15.4 G/DL (ref 14–18)
IMM GRANULOCYTES # BLD AUTO: 0.04 K/UL (ref 0–0.04)
IMM GRANULOCYTES NFR BLD AUTO: 0.4 % (ref 0–0.5)
LYMPHOCYTES # BLD AUTO: 1.5 K/UL (ref 1–4.8)
LYMPHOCYTES NFR BLD: 15.3 % (ref 18–48)
MCH RBC QN AUTO: 28.2 PG (ref 27–31)
MCHC RBC AUTO-ENTMCNC: 33.7 G/DL (ref 32–36)
MCV RBC AUTO: 84 FL (ref 82–98)
MONOCYTES # BLD AUTO: 0.8 K/UL (ref 0.3–1)
MONOCYTES NFR BLD: 8.3 % (ref 4–15)
NEUTROPHILS # BLD AUTO: 7.5 K/UL (ref 1.8–7.7)
NEUTROPHILS NFR BLD: 75.6 % (ref 38–73)
NRBC BLD-RTO: 0 /100 WBC
PLATELET # BLD AUTO: 253 K/UL (ref 150–450)
PMV BLD AUTO: 9.2 FL (ref 9.2–12.9)
POTASSIUM SERPL-SCNC: 3.6 MMOL/L (ref 3.5–5.1)
PROT SERPL-MCNC: 7.2 G/DL (ref 6–8.4)
RBC # BLD AUTO: 5.47 M/UL (ref 4.6–6.2)
SODIUM SERPL-SCNC: 137 MMOL/L (ref 136–145)
WBC # BLD AUTO: 9.98 K/UL (ref 3.9–12.7)

## 2022-01-22 PROCEDURE — 25000003 PHARM REV CODE 250: Performed by: EMERGENCY MEDICINE

## 2022-01-22 PROCEDURE — 85025 COMPLETE CBC W/AUTO DIFF WBC: CPT | Performed by: PHYSICIAN ASSISTANT

## 2022-01-22 PROCEDURE — 96375 TX/PRO/DX INJ NEW DRUG ADDON: CPT

## 2022-01-22 PROCEDURE — 63600175 PHARM REV CODE 636 W HCPCS: Performed by: EMERGENCY MEDICINE

## 2022-01-22 PROCEDURE — 99285 EMERGENCY DEPT VISIT HI MDM: CPT | Mod: ,,, | Performed by: PHYSICIAN ASSISTANT

## 2022-01-22 PROCEDURE — 99285 EMERGENCY DEPT VISIT HI MDM: CPT | Mod: 25

## 2022-01-22 PROCEDURE — 96374 THER/PROPH/DIAG INJ IV PUSH: CPT

## 2022-01-22 PROCEDURE — 99285 PR EMERGENCY DEPT VISIT,LEVEL V: ICD-10-PCS | Mod: ,,, | Performed by: PHYSICIAN ASSISTANT

## 2022-01-22 PROCEDURE — 25000003 PHARM REV CODE 250: Performed by: PHYSICIAN ASSISTANT

## 2022-01-22 PROCEDURE — 80053 COMPREHEN METABOLIC PANEL: CPT | Performed by: PHYSICIAN ASSISTANT

## 2022-01-22 PROCEDURE — 63600175 PHARM REV CODE 636 W HCPCS: Performed by: PHYSICIAN ASSISTANT

## 2022-01-22 RX ORDER — HYDROMORPHONE HYDROCHLORIDE 1 MG/ML
1 INJECTION, SOLUTION INTRAMUSCULAR; INTRAVENOUS; SUBCUTANEOUS
Status: COMPLETED | OUTPATIENT
Start: 2022-01-22 | End: 2022-01-22

## 2022-01-22 RX ORDER — DEXAMETHASONE SODIUM PHOSPHATE 4 MG/ML
12 INJECTION, SOLUTION INTRA-ARTICULAR; INTRALESIONAL; INTRAMUSCULAR; INTRAVENOUS; SOFT TISSUE
Status: COMPLETED | OUTPATIENT
Start: 2022-01-22 | End: 2022-01-22

## 2022-01-22 RX ORDER — GABAPENTIN 300 MG/1
300 CAPSULE ORAL 3 TIMES DAILY
Status: DISCONTINUED | OUTPATIENT
Start: 2022-01-22 | End: 2022-01-23

## 2022-01-22 RX ORDER — ACETAMINOPHEN 500 MG
1000 TABLET ORAL
Status: COMPLETED | OUTPATIENT
Start: 2022-01-22 | End: 2022-01-22

## 2022-01-22 RX ORDER — TIZANIDINE 2 MG/1
4 TABLET ORAL ONCE
Status: COMPLETED | OUTPATIENT
Start: 2022-01-22 | End: 2022-01-22

## 2022-01-22 RX ORDER — LIDOCAINE 50 MG/G
1 PATCH TOPICAL
Status: DISCONTINUED | OUTPATIENT
Start: 2022-01-22 | End: 2022-01-23

## 2022-01-22 RX ORDER — LIDOCAINE 50 MG/G
1 PATCH TOPICAL
Status: DISCONTINUED | OUTPATIENT
Start: 2022-01-22 | End: 2022-01-22

## 2022-01-22 RX ORDER — MORPHINE SULFATE 2 MG/ML
2 INJECTION, SOLUTION INTRAMUSCULAR; INTRAVENOUS
Status: COMPLETED | OUTPATIENT
Start: 2022-01-22 | End: 2022-01-22

## 2022-01-22 RX ADMIN — DEXAMETHASONE SODIUM PHOSPHATE 12 MG: 4 INJECTION INTRA-ARTICULAR; INTRALESIONAL; INTRAMUSCULAR; INTRAVENOUS; SOFT TISSUE at 11:01

## 2022-01-22 RX ADMIN — TIZANIDINE 4 MG: 2 TABLET ORAL at 11:01

## 2022-01-22 RX ADMIN — HYDROMORPHONE HYDROCHLORIDE 1 MG: 1 INJECTION, SOLUTION INTRAMUSCULAR; INTRAVENOUS; SUBCUTANEOUS at 11:01

## 2022-01-22 RX ADMIN — GABAPENTIN 300 MG: 300 CAPSULE ORAL at 11:01

## 2022-01-22 RX ADMIN — ACETAMINOPHEN 1000 MG: 500 TABLET ORAL at 11:01

## 2022-01-22 RX ADMIN — MORPHINE SULFATE 2 MG: 2 INJECTION, SOLUTION INTRAMUSCULAR; INTRAVENOUS at 10:01

## 2022-01-22 NOTE — Clinical Note
Is this patient a high probability for COVID-19?: Yes   Diagnosis: Back pain [[id number]]   Future Attending Provider: HARMONY OCAMPO [[id number]]   Admitting Provider:: HARMONY OCAMPO [[id number]]

## 2022-01-23 PROBLEM — I10 HTN (HYPERTENSION): Status: ACTIVE | Noted: 2022-01-23

## 2022-01-23 PROBLEM — U07.1 COVID-19 VIRUS INFECTION: Status: ACTIVE | Noted: 2022-01-23

## 2022-01-23 PROBLEM — M47.26 OTHER SPONDYLOSIS WITH RADICULOPATHY, LUMBAR REGION: Status: ACTIVE | Noted: 2022-01-23

## 2022-01-23 LAB
ANION GAP SERPL CALC-SCNC: 11 MMOL/L (ref 8–16)
BASOPHILS # BLD AUTO: 0.02 K/UL (ref 0–0.2)
BASOPHILS NFR BLD: 0.2 % (ref 0–1.9)
BILIRUB UR QL STRIP: NEGATIVE
BNP SERPL-MCNC: 43 PG/ML (ref 0–99)
BUN SERPL-MCNC: 18 MG/DL (ref 6–20)
CALCIUM SERPL-MCNC: 9.5 MG/DL (ref 8.7–10.5)
CHLORIDE SERPL-SCNC: 103 MMOL/L (ref 95–110)
CK SERPL-CCNC: 71 U/L (ref 20–200)
CLARITY UR REFRACT.AUTO: ABNORMAL
CO2 SERPL-SCNC: 22 MMOL/L (ref 23–29)
COLOR UR AUTO: YELLOW
CREAT SERPL-MCNC: 1 MG/DL (ref 0.5–1.4)
DIFFERENTIAL METHOD: ABNORMAL
EOSINOPHIL # BLD AUTO: 0 K/UL (ref 0–0.5)
EOSINOPHIL NFR BLD: 0 % (ref 0–8)
ERYTHROCYTE [DISTWIDTH] IN BLOOD BY AUTOMATED COUNT: 11.9 % (ref 11.5–14.5)
ERYTHROCYTE [SEDIMENTATION RATE] IN BLOOD BY WESTERGREN METHOD: 20 MM/HR (ref 0–23)
EST. GFR  (AFRICAN AMERICAN): >60 ML/MIN/1.73 M^2
EST. GFR  (NON AFRICAN AMERICAN): >60 ML/MIN/1.73 M^2
FERRITIN SERPL-MCNC: 562 NG/ML (ref 20–300)
GLUCOSE SERPL-MCNC: 198 MG/DL (ref 70–110)
GLUCOSE UR QL STRIP: NEGATIVE
HCT VFR BLD AUTO: 47.3 % (ref 40–54)
HGB BLD-MCNC: 16.3 G/DL (ref 14–18)
HGB UR QL STRIP: NEGATIVE
IMM GRANULOCYTES # BLD AUTO: 0.05 K/UL (ref 0–0.04)
IMM GRANULOCYTES NFR BLD AUTO: 0.4 % (ref 0–0.5)
INR PPP: 0.9 (ref 0.8–1.2)
KETONES UR QL STRIP: NEGATIVE
LACTATE SERPL-SCNC: 2.6 MMOL/L (ref 0.5–2.2)
LDH SERPL L TO P-CCNC: 234 U/L (ref 110–260)
LEUKOCYTE ESTERASE UR QL STRIP: NEGATIVE
LYMPHOCYTES # BLD AUTO: 0.9 K/UL (ref 1–4.8)
LYMPHOCYTES NFR BLD: 7.4 % (ref 18–48)
MAGNESIUM SERPL-MCNC: 1.8 MG/DL (ref 1.6–2.6)
MCH RBC QN AUTO: 28.6 PG (ref 27–31)
MCHC RBC AUTO-ENTMCNC: 34.5 G/DL (ref 32–36)
MCV RBC AUTO: 83 FL (ref 82–98)
MONOCYTES # BLD AUTO: 0.2 K/UL (ref 0.3–1)
MONOCYTES NFR BLD: 1.3 % (ref 4–15)
NEUTROPHILS # BLD AUTO: 11 K/UL (ref 1.8–7.7)
NEUTROPHILS NFR BLD: 90.7 % (ref 38–73)
NITRITE UR QL STRIP: NEGATIVE
NRBC BLD-RTO: 0 /100 WBC
PH UR STRIP: 7 [PH] (ref 5–8)
PLATELET # BLD AUTO: 284 K/UL (ref 150–450)
PMV BLD AUTO: 9.7 FL (ref 9.2–12.9)
POTASSIUM SERPL-SCNC: 4.2 MMOL/L (ref 3.5–5.1)
PROT UR QL STRIP: NEGATIVE
PROTHROMBIN TIME: 10.3 SEC (ref 9–12.5)
RBC # BLD AUTO: 5.7 M/UL (ref 4.6–6.2)
SODIUM SERPL-SCNC: 136 MMOL/L (ref 136–145)
SP GR UR STRIP: 1.01 (ref 1–1.03)
TROPONIN I SERPL DL<=0.01 NG/ML-MCNC: <0.006 NG/ML (ref 0–0.03)
URN SPEC COLLECT METH UR: ABNORMAL
WBC # BLD AUTO: 12.09 K/UL (ref 3.9–12.7)

## 2022-01-23 PROCEDURE — 83615 LACTATE (LD) (LDH) ENZYME: CPT | Performed by: NURSE PRACTITIONER

## 2022-01-23 PROCEDURE — 84484 ASSAY OF TROPONIN QUANT: CPT | Performed by: NURSE PRACTITIONER

## 2022-01-23 PROCEDURE — 85025 COMPLETE CBC W/AUTO DIFF WBC: CPT | Performed by: NURSE PRACTITIONER

## 2022-01-23 PROCEDURE — G0378 HOSPITAL OBSERVATION PER HR: HCPCS

## 2022-01-23 PROCEDURE — 83880 ASSAY OF NATRIURETIC PEPTIDE: CPT | Performed by: NURSE PRACTITIONER

## 2022-01-23 PROCEDURE — 83735 ASSAY OF MAGNESIUM: CPT | Performed by: NURSE PRACTITIONER

## 2022-01-23 PROCEDURE — 82728 ASSAY OF FERRITIN: CPT | Performed by: NURSE PRACTITIONER

## 2022-01-23 PROCEDURE — 63600175 PHARM REV CODE 636 W HCPCS: Performed by: NURSE PRACTITIONER

## 2022-01-23 PROCEDURE — 63600175 PHARM REV CODE 636 W HCPCS: Performed by: STUDENT IN AN ORGANIZED HEALTH CARE EDUCATION/TRAINING PROGRAM

## 2022-01-23 PROCEDURE — 96372 THER/PROPH/DIAG INJ SC/IM: CPT | Mod: 59

## 2022-01-23 PROCEDURE — 81003 URINALYSIS AUTO W/O SCOPE: CPT | Performed by: NURSE PRACTITIONER

## 2022-01-23 PROCEDURE — 25000003 PHARM REV CODE 250: Performed by: NURSE PRACTITIONER

## 2022-01-23 PROCEDURE — 85652 RBC SED RATE AUTOMATED: CPT | Performed by: NURSE PRACTITIONER

## 2022-01-23 PROCEDURE — 99218 PR INITIAL OBSERVATION CARE,LEVL I: CPT | Mod: ,,, | Performed by: NURSE PRACTITIONER

## 2022-01-23 PROCEDURE — 85610 PROTHROMBIN TIME: CPT | Performed by: NURSE PRACTITIONER

## 2022-01-23 PROCEDURE — 96375 TX/PRO/DX INJ NEW DRUG ADDON: CPT

## 2022-01-23 PROCEDURE — 99218 PR INITIAL OBSERVATION CARE,LEVL I: ICD-10-PCS | Mod: ,,, | Performed by: NURSE PRACTITIONER

## 2022-01-23 PROCEDURE — 96376 TX/PRO/DX INJ SAME DRUG ADON: CPT

## 2022-01-23 PROCEDURE — 83605 ASSAY OF LACTIC ACID: CPT | Performed by: NURSE PRACTITIONER

## 2022-01-23 PROCEDURE — 97530 THERAPEUTIC ACTIVITIES: CPT

## 2022-01-23 PROCEDURE — 82550 ASSAY OF CK (CPK): CPT | Performed by: NURSE PRACTITIONER

## 2022-01-23 PROCEDURE — 80048 BASIC METABOLIC PNL TOTAL CA: CPT | Performed by: NURSE PRACTITIONER

## 2022-01-23 PROCEDURE — 97161 PT EVAL LOW COMPLEX 20 MIN: CPT

## 2022-01-23 RX ORDER — GABAPENTIN 300 MG/1
600 CAPSULE ORAL 3 TIMES DAILY
Status: DISCONTINUED | OUTPATIENT
Start: 2022-01-23 | End: 2022-01-25

## 2022-01-23 RX ORDER — METHOCARBAMOL 500 MG/1
1000 TABLET, FILM COATED ORAL 4 TIMES DAILY
Status: DISCONTINUED | OUTPATIENT
Start: 2022-01-23 | End: 2022-01-27 | Stop reason: HOSPADM

## 2022-01-23 RX ORDER — METHYLPREDNISOLONE 4 MG/1
4 TABLET ORAL DAILY
Status: DISCONTINUED | OUTPATIENT
Start: 2022-01-23 | End: 2022-01-25

## 2022-01-23 RX ORDER — OXYCODONE HYDROCHLORIDE 5 MG/1
5 TABLET ORAL EVERY 6 HOURS PRN
Status: DISCONTINUED | OUTPATIENT
Start: 2022-01-23 | End: 2022-01-24

## 2022-01-23 RX ORDER — TALC
6 POWDER (GRAM) TOPICAL NIGHTLY PRN
Status: DISCONTINUED | OUTPATIENT
Start: 2022-01-23 | End: 2022-01-27 | Stop reason: HOSPADM

## 2022-01-23 RX ORDER — OXYCODONE HYDROCHLORIDE 10 MG/1
10 TABLET ORAL EVERY 6 HOURS PRN
Status: DISCONTINUED | OUTPATIENT
Start: 2022-01-23 | End: 2022-01-23

## 2022-01-23 RX ORDER — IBUPROFEN 200 MG
24 TABLET ORAL
Status: DISCONTINUED | OUTPATIENT
Start: 2022-01-23 | End: 2022-01-27 | Stop reason: HOSPADM

## 2022-01-23 RX ORDER — IBUPROFEN 200 MG
16 TABLET ORAL
Status: DISCONTINUED | OUTPATIENT
Start: 2022-01-23 | End: 2022-01-27 | Stop reason: HOSPADM

## 2022-01-23 RX ORDER — SODIUM CHLORIDE 0.9 % (FLUSH) 0.9 %
10 SYRINGE (ML) INJECTION
Status: DISCONTINUED | OUTPATIENT
Start: 2022-01-23 | End: 2022-01-27 | Stop reason: HOSPADM

## 2022-01-23 RX ORDER — ACETAMINOPHEN 500 MG
1000 TABLET ORAL EVERY 8 HOURS
Status: DISCONTINUED | OUTPATIENT
Start: 2022-01-23 | End: 2022-01-27 | Stop reason: HOSPADM

## 2022-01-23 RX ORDER — ENOXAPARIN SODIUM 100 MG/ML
40 INJECTION SUBCUTANEOUS
Status: DISCONTINUED | OUTPATIENT
Start: 2022-01-23 | End: 2022-01-23

## 2022-01-23 RX ORDER — ASCORBIC ACID 500 MG
500 TABLET ORAL 2 TIMES DAILY
Status: DISCONTINUED | OUTPATIENT
Start: 2022-01-23 | End: 2022-01-27 | Stop reason: HOSPADM

## 2022-01-23 RX ORDER — LISINOPRIL 5 MG/1
5 TABLET ORAL DAILY
Status: DISCONTINUED | OUTPATIENT
Start: 2022-01-23 | End: 2022-01-27 | Stop reason: HOSPADM

## 2022-01-23 RX ORDER — METHYLPREDNISOLONE 4 MG/1
4 TABLET ORAL DAILY
Status: DISCONTINUED | OUTPATIENT
Start: 2022-01-23 | End: 2022-01-23

## 2022-01-23 RX ORDER — TALC
6 POWDER (GRAM) TOPICAL NIGHTLY PRN
Status: DISCONTINUED | OUTPATIENT
Start: 2022-01-23 | End: 2022-01-23

## 2022-01-23 RX ORDER — LIDOCAINE 50 MG/G
1 PATCH TOPICAL
Status: DISCONTINUED | OUTPATIENT
Start: 2022-01-23 | End: 2022-01-24

## 2022-01-23 RX ORDER — MORPHINE SULFATE 2 MG/ML
2 INJECTION, SOLUTION INTRAMUSCULAR; INTRAVENOUS EVERY 6 HOURS PRN
Status: DISCONTINUED | OUTPATIENT
Start: 2022-01-23 | End: 2022-01-24

## 2022-01-23 RX ORDER — ENOXAPARIN SODIUM 100 MG/ML
40 INJECTION SUBCUTANEOUS
Status: DISCONTINUED | OUTPATIENT
Start: 2022-01-23 | End: 2022-01-27 | Stop reason: HOSPADM

## 2022-01-23 RX ORDER — KETOROLAC TROMETHAMINE 30 MG/ML
15 INJECTION, SOLUTION INTRAMUSCULAR; INTRAVENOUS EVERY 6 HOURS
Status: DISCONTINUED | OUTPATIENT
Start: 2022-01-23 | End: 2022-01-24

## 2022-01-23 RX ORDER — GLUCAGON 1 MG
1 KIT INJECTION
Status: DISCONTINUED | OUTPATIENT
Start: 2022-01-23 | End: 2022-01-27 | Stop reason: HOSPADM

## 2022-01-23 RX ORDER — ACETAMINOPHEN 325 MG/1
650 TABLET ORAL EVERY 8 HOURS PRN
Status: DISCONTINUED | OUTPATIENT
Start: 2022-01-23 | End: 2022-01-27 | Stop reason: HOSPADM

## 2022-01-23 RX ORDER — METHOCARBAMOL 750 MG/1
750 TABLET, FILM COATED ORAL 4 TIMES DAILY
Status: DISCONTINUED | OUTPATIENT
Start: 2022-01-23 | End: 2022-01-23

## 2022-01-23 RX ORDER — AMLODIPINE BESYLATE 5 MG/1
5 TABLET ORAL DAILY
Status: DISCONTINUED | OUTPATIENT
Start: 2022-01-23 | End: 2022-01-23

## 2022-01-23 RX ADMIN — LIDOCAINE 5% 1 PATCH: 700 PATCH TOPICAL at 03:01

## 2022-01-23 RX ADMIN — ACETAMINOPHEN 1000 MG: 500 TABLET ORAL at 03:01

## 2022-01-23 RX ADMIN — KETOROLAC TROMETHAMINE 15 MG: 30 INJECTION, SOLUTION INTRAMUSCULAR; INTRAVENOUS at 01:01

## 2022-01-23 RX ADMIN — METHOCARBAMOL 1000 MG: 500 TABLET ORAL at 06:01

## 2022-01-23 RX ADMIN — ACETAMINOPHEN 1000 MG: 500 TABLET ORAL at 05:01

## 2022-01-23 RX ADMIN — GABAPENTIN 600 MG: 300 CAPSULE ORAL at 03:01

## 2022-01-23 RX ADMIN — OXYCODONE 5 MG: 5 TABLET ORAL at 06:01

## 2022-01-23 RX ADMIN — OXYCODONE HYDROCHLORIDE AND ACETAMINOPHEN 500 MG: 500 TABLET ORAL at 08:01

## 2022-01-23 RX ADMIN — KETOROLAC TROMETHAMINE 15 MG: 30 INJECTION, SOLUTION INTRAMUSCULAR; INTRAVENOUS at 06:01

## 2022-01-23 RX ADMIN — LISINOPRIL 5 MG: 5 TABLET ORAL at 10:01

## 2022-01-23 RX ADMIN — METHYLPREDNISOLONE 4 MG: 4 TABLET ORAL at 10:01

## 2022-01-23 RX ADMIN — THERA TABS 1 TABLET: TAB at 10:01

## 2022-01-23 RX ADMIN — METHOCARBAMOL 1000 MG: 500 TABLET ORAL at 10:01

## 2022-01-23 RX ADMIN — KETOROLAC TROMETHAMINE 15 MG: 30 INJECTION, SOLUTION INTRAMUSCULAR; INTRAVENOUS at 05:01

## 2022-01-23 RX ADMIN — GABAPENTIN 600 MG: 300 CAPSULE ORAL at 10:01

## 2022-01-23 RX ADMIN — OXYCODONE HYDROCHLORIDE AND ACETAMINOPHEN 500 MG: 500 TABLET ORAL at 10:01

## 2022-01-23 RX ADMIN — OXYCODONE HYDROCHLORIDE 10 MG: 10 TABLET ORAL at 10:01

## 2022-01-23 RX ADMIN — METHOCARBAMOL 750 MG: 750 TABLET ORAL at 01:01

## 2022-01-23 RX ADMIN — METHOCARBAMOL 1000 MG: 500 TABLET ORAL at 08:01

## 2022-01-23 RX ADMIN — Medication 6 MG: at 08:01

## 2022-01-23 RX ADMIN — METHOCARBAMOL 1000 MG: 500 TABLET ORAL at 01:01

## 2022-01-23 RX ADMIN — MORPHINE SULFATE 2 MG: 2 INJECTION, SOLUTION INTRAMUSCULAR; INTRAVENOUS at 09:01

## 2022-01-23 RX ADMIN — ENOXAPARIN SODIUM 40 MG: 100 INJECTION SUBCUTANEOUS at 06:01

## 2022-01-23 RX ADMIN — GABAPENTIN 600 MG: 300 CAPSULE ORAL at 08:01

## 2022-01-23 NOTE — ED PROVIDER NOTES
Encounter Date: 1/22/2022       History     Chief Complaint   Patient presents with    Transfer     From [place] for MRI. Hx of chronic back pain but injured back in November but hurt his back today on his inversion table and now unable to walk d/t the pain     42-year-old male with history of chronic lower back pain, hypertension who was transferred from [facility] emergency department for lumbar MRI.  Patient has history of chronic lower back pain that radiates down the left leg states that he had a steroid epidural 2 days ago with improvement of his symptoms however yesterday as he was walking from a truck began experiencing a 10/10 shooting pain that radiates down to his left heel as well as to his left groin.  Patient denies any saddle anesthesia, loss of bowel or bladder, history of IV drug use or fever/chills.  Patient's is also not vaccinated for COVID-19 and was found to be COVID-19 positive.  Denies any symptoms of COVID.        Review of patient's allergies indicates:  No Known Allergies  Past Medical History:   Diagnosis Date    Chronic back pain      Past Surgical History:   Procedure Laterality Date    APPENDECTOMY  1996     Family History   Problem Relation Age of Onset    Diabetes Father     Heart attack Paternal Grandfather 67    Stroke Neg Hx     Colon cancer Neg Hx     Lung cancer Neg Hx     Prostate cancer Neg Hx      Social History     Tobacco Use    Smoking status: Current Every Day Smoker     Packs/day: 1.00     Types: Cigarettes    Smokeless tobacco: Never Used   Substance Use Topics    Alcohol use: No    Drug use: No     Review of Systems   Constitutional: Negative for chills and fever.   HENT: Negative for ear pain and sore throat.    Eyes: Negative for pain.   Respiratory: Negative for cough and shortness of breath.    Cardiovascular: Negative for chest pain.   Gastrointestinal: Negative for abdominal pain, diarrhea, nausea and vomiting.   Endocrine: Negative.     Genitourinary: Negative for difficulty urinating.   Musculoskeletal: Positive for arthralgias and back pain.   Allergic/Immunologic: Negative for immunocompromised state.   Neurological: Negative for headaches.   Hematological: Negative for adenopathy.   Psychiatric/Behavioral: Negative for confusion.       Physical Exam     Initial Vitals [01/22/22 1944]   BP Pulse Resp Temp SpO2   (!) 145/68 78 16 98.5 °F (36.9 °C) 98 %      MAP       --         Physical Exam    Nursing note and vitals reviewed.  Constitutional: He appears well-developed and well-nourished. He is not diaphoretic. He appears distressed.   Eyes: Conjunctivae are normal. Pupils are equal, round, and reactive to light.   Neck: Neck supple.   Normal range of motion.  Cardiovascular: Normal rate, regular rhythm, normal heart sounds and intact distal pulses. Exam reveals no gallop and no friction rub.    No murmur heard.  Pulmonary/Chest: Breath sounds normal.   Abdominal: Abdomen is soft. Bowel sounds are normal. He exhibits no distension and no mass. There is no abdominal tenderness. There is no rebound and no guarding.   Musculoskeletal:         General: Tenderness present. No edema.      Cervical back: Normal range of motion and neck supple.      Comments: Limited range of motion of the hip secondary to pain.  No midline cervical thoracic or lumbar tenderness.  Positive straight leg as well as cross straight leg test raise.  Lower extremities are neurovascularly intact.     Neurological: He is alert and oriented to person, place, and time. No cranial nerve deficit. GCS score is 15. GCS eye subscore is 4. GCS verbal subscore is 5. GCS motor subscore is 6.   Skin: Skin is warm and dry. Capillary refill takes less than 2 seconds.   Psychiatric: He has a normal mood and affect.         ED Course   Procedures  Labs Reviewed   CBC W/ AUTO DIFFERENTIAL - Abnormal; Notable for the following components:       Result Value    Gran % 75.6 (*)     Lymph % 15.3  (*)     All other components within normal limits   COMPREHENSIVE METABOLIC PANEL          Imaging Results           MRI Lumbar Spine Without Contrast (Final result)  Result time 01/22/22 22:08:35    Final result by Deandre Miranda MD (01/22/22 22:08:35)                 Impression:      Lower lumbar spondylosis with L4-5 left paracentral inferior disc extrusion extending 1.0 cm caudal to the L5 superior endplate resulting in mild spinal canal narrowing and bilateral moderate neural foraminal narrowing.  Extrusion compresses the exiting L4 nerve and closely approximates the descending L5 nerve root which likely contributes to patient's reported symptoms.    This report was flagged in Epic as abnormal.    Electronically signed by resident: Dian Francisco  Date:    01/22/2022  Time:    21:30    Electronically signed by: Deandre Miranda  Date:    01/22/2022  Time:    22:08             Narrative:    EXAMINATION:  MRI LUMBAR SPINE WITHOUT CONTRAST    CLINICAL HISTORY:  Lumbar radiculopathy, symptoms persist with conservative treatment;    TECHNIQUE:  Multiplanar, multisequence MR images were acquired from the thoracolumbar junction to the sacrum without contrast.    COMPARISON:  Lumbar spine radiograph 12/27/2021    FINDINGS:  The demonstrated portion of the spinal cord is normal in signal intensity at all levels with no indication of myelomalacia or cord edema. Conus terminates at . Evaluation of the surrounding soft tissue structures demonstrates no acute abnormality.    Alignment: L4 on L5 grade 1 retrolisthesis.  L5 on S1 grade 1 retrolisthesis.    Vertebrae: Normal marrow signal. No fracture.  No height loss.    Discs: L4-5 and L5-S1 disc space narrowing and disc desiccation.    Cord: Normal.  Conus terminates at L1.    Degenerative findings:    T12-L1: No significant canal stenosis or neural foraminal narrowing.    L1-L2: No significant canal stenosis or neural foraminal narrowing.    L2-L3: No significant canal  stenosis or neural foraminal narrowing.    L3-L4: Mild circumferential disc bulge and bilateral mild facet arthropathy without significant spinal canal stenosis or neural foraminal narrowing.    L4-L5: Circumferential disc bulge with superimposed left paracentral caudal disc extrusion that extends 1.0 cm inferiorly from the L5 superior endplate, bilateral ligamentum flavum hypertrophy and facet arthropathy result in mild spinal canal narrowing and bilateral moderate neural foraminal narrowing.  Disc extrusion compresses the exiting left L4 nerve and closely approximates the left descending L5 nerve root.    L5-S1: Broad-based posterior disc bulge asymmetric to the right and bilateral facet arthropathy result in bilateral mild neural foraminal narrowing.    Paraspinal muscles & soft tissues: Subcentimeter simple left renal cyst.                                 Medications   morphine injection 2 mg (2 mg Intravenous Given 1/22/22 2215)     Medical Decision Making:   History:   Old Medical Records: I decided to obtain old medical records.  Old Records Summarized: records from previous admission(s).  Clinical Tests:   Lab Tests: Ordered and Reviewed  Radiological Study: Ordered and Reviewed  Other:   I have discussed this case with another health care provider.       APC / Resident Notes:   42 y.o. year old male presenting with lower back pain.  On exam patient is afebrile and nontoxic.Heart rate and rhythm are regular. Lungs with clear breath sounds throughout. Abdomen is soft, nontender. No edema.  No midline cervical, thoracic or lumbar tenderness.  Patient range of motion is limited secondary to pain.  Positive straight leg test as well as cross straight leg test raise.  Bilateral lower extremities are neurovascularly intact.    DDx includes but is not limited to cauda equina syndrome, sciatica, spinal epidural abscess, disc herniation    ED workup reveals patient transferred from [facility] ED for MRI lumbar  spine which was approved by Dr. Osborn with Neurosurgery.  No red flag symptoms, patient denies any saddle anesthesia, history of IV drug use, fevers/chills or loss of bowel or bladder.  No midline tenderness on exam.  Low suspicion for any infectious etiology.  MRI of the L-spine shows Lower lumbar spondylosis with mild canal narrowing.  Discussed case with neurosurgery who recommends admission to hospital medicine for pain control.       I discussed the care of this patient with my supervising physician.         Attending Attestation:     Physician Attestation Statement for NP/PA:   I discussed this assessment and plan of this patient with the NP/PA, but I did not personally examine the patient. The face to face encounter was performed by the NP/PA.    Other NP/PA Attestation Additions:      Medical Decision Making: Pt with AoC back pain after exacerbation, no F/C or IVDA concerning for infection, MRI pending.                       Clinical Impression:   Final diagnoses:  [U07.1] COVID  [M54.9] Back pain  [M54.50] Acute midline low back pain, unspecified whether sciatica present (Primary)          ED Disposition Condition    Admit               Vinay Lzao PA-C  01/22/22 2240       Vinay Lazo PA-C  01/22/22 9996       Sussy Hernández MD  01/23/22 2244       Vinay Lazo PA-C  01/28/22 1200

## 2022-01-23 NOTE — ED NOTES
Repeat vs were obtained.  Pt had been medicated as ordered by Rema.  Visitor at bedside. Will continue to monitor.

## 2022-01-23 NOTE — PROVIDER PROGRESS NOTES - EMERGENCY DEPT.
I have assumed care for this patient from Dr. Hernández           10:23 PM . I have reviewed case, and have seen patient. I have read the chart and discussed care with the previous attending. I agree with the plan as previously determined. Since assuming care, the patient's course includes:      Transferred for low back pain w/ left leg pain. Hx of low back pain  NSINOCENTE Osborn approved the transfer, team was not contacted    MRI: Lower lumbar spondylosis with L4-5 left paracentral inferior disc extrusion extending 1.0 cm caudal to the L5 superior endplate resulting in mild spinal canal narrowing and bilateral moderate neural foraminal narrowing.  Extrusion compresses the exiting L4 nerve and closely approximates the descending L5 nerve root which likely contributes to patient's reported symptoms.       11:17 PM  patient in severe pain, crying, difficulty moving in pain. Received morphine, toradol, fentanyl    Will add tylenol, lidocaine patch, tizanidine, dilaudid, dexamethasone, gabapentin  NSGY eval pending  To admit for pain control, PT  Patient also tested positive for Covid

## 2022-01-23 NOTE — PT/OT/SLP EVAL
Physical Therapy Evaluation/Treatment    Patient Name:  Varun Graham Sr.   MRN:  [id number]    Recommendations:     Discharge Recommendations:  outpatient PT   Discharge Equipment Recommendations: bedside commode,walker, rolling   Barriers to discharge: increased pain and limited mobility    Assessment:     Varun Graham Sr. is a 42 y.o. male admitted with a medical diagnosis of Other spondylosis with radiculopathy, lumbar region.  He presents with the following impairments/functional limitations:  weakness,impaired balance,impaired endurance,pain,impaired self care skills,impaired functional mobilty,gait instability,decreased lower extremity function,impaired sensation. Pt presents with increased pain in back and LLE, mercedes with movement. Activity tolerance limited due to increased pain. Completed transfer to bedside chair; however, pt unable to remain sitting upright and required return to supine due to pain. Pt may benefit from LSO for support and comfort. Pt would continue to benefit from skilled acute PT in order to address current deficits and progress functional mobility.     Rehab Prognosis: Good; patient would benefit from acute skilled PT services to address these deficits and reach maximum level of function.    Recent Surgery: * No surgery found *      Plan:     During this hospitalization, patient to be seen 3 x/week to address the identified rehab impairments via gait training,therapeutic activities,therapeutic exercises,neuromuscular re-education and progress toward the following goals:    · Plan of Care Expires:  02/21/22    Subjective     Chief Complaint: back pain  Patient/Family Comments/goals: decreased pain and return to functional independence  Pain/Comfort:  · Pain Rating 1: 4/10 (at rest)  · Location - Orientation 1: lower  · Location 1: back  · Pain Addressed 1: Pre-medicate for activity,Reposition,Distraction,Cessation of Activity  · Pain Rating Post-Intervention 1:  (increased with movement;  did not rate)    Patients cultural, spiritual, Pentec[employer]al conflicts given the current situation: no    Living Environment:  Pt lives with his wife and 3 children in a 2SH with TH to enter. Pt has been staying on 1st floor of home and sleeping on couch, as he has not been able to navigate stairs due to pain.   Prior to back injury, patients level of function was independent with ambulation and ADLs. Drives. Works as a . Equipment used at home: none.  DME owned (not currently used): none.  Upon discharge, patient will have assistance from wife and children.    Objective:     Communicated with RN prior to session.  Patient found supine with telemetry  upon PT entry to room.    General Precautions: Standard, fall,airborne,contact,droplet   Orthopedic Precautions:spinal precautions   Braces: N/A  Respiratory Status: Room air    Exams:  · Cognitive Exam:  Patient is oriented to Person, Place, Time and Situation  · Sensation:    · -       Impaired  light/touch L foot (numbness reported)  · RLE ROM: WFL  · RLE Strength: WFL based on observation of mobility; MADYSON 2* increased pain  · LLE ROM: WFL  · LLE Strength: WFL based on observation of mobility; MADYSON 2* increased pain    Functional Mobility:  · Bed Mobility:     · Supine to Sit: contact guard assistance with HOB flat  · Cues for log-roll technique   · Sit to Supine: contact guard assistance with HOB flat  · Cues for log-roll technique   · Transfers:    · Sit<>Stand:  minimum assistance with hand-held assist, x1 rep from EOB and x1 rep from bedside chair   · Bed<>Chair: minimum assistance with  hand-held assist  using  Stand Pivot  · Gait: limited to steps during stand pivot transfers bed<>chair with Henry and B HHA  · Distance limited 2* increased pain in back and LLE  · demo'ing increased knee flex BLE, impaired weight-shifting ability, decreased step length, decreased toe-floor clearance, and instability  · Cues provided for sequencing and safe  technique     Therapeutic Activities and Exercises:  Pt educated on role of PT and PT POC. Pt verbalized understanding.   Pt educated on spinal precautions and log-roll technique. Pt verbalized and demonstrated understanding.  Pt educated on d/c recs for OP PT and reasoning for recs. Pt verbalized understanding.  Pt educated on the effects of bed rest and the importance of OOB activity. Pt encouraged to continue daily OOB mobility with nursing assist. Pt verbalized understanding.  Pt oriented to call bell and instructed to call for staff assist with standing tasks/transfers. Pt verbalized understanding.     AM-PAC 6 CLICK MOBILITY  Total Score:14     Patient left supine with all lines intact, call button in reach and pt's wife present.    GOALS:   Multidisciplinary Problems     Physical Therapy Goals        Problem: Physical Therapy Goal    Goal Priority Disciplines Outcome Goal Variances Interventions   Physical Therapy Goal     PT, PT/OT Ongoing, Progressing     Description: Goals to be met by: 2022     Patient will increase functional independence with mobility by performin. Supine to sit with Supervision  2. Sit to stand transfer with Supervision  3. Gait  x 150 feet with Supervision without AD or using LRAD as needed.   4. Ascend/descend 1 flight of stairs with Handrails Stand-by Assistance.  5. Lower extremity exercise program x15 reps, with supervision, in order to increase LE strength and (I) with functional mobility.                       History:     Past Medical History:   Diagnosis Date    Chronic back pain        Past Surgical History:   Procedure Laterality Date    APPENDECTOMY         Time Tracking:     PT Received On: 22  PT Start Time: 1510     PT Stop Time: 1534  PT Total Time (min): 24 min     Billable Minutes: Evaluation 16 and Therapeutic Activity 8      2022

## 2022-01-23 NOTE — ED TRIAGE NOTES
Pt transferred to ER for MRI. Pt has history of lower back problems and pt states he injured it today on a piece of workout equipment. Pt reports he is unable to ambulate.

## 2022-01-23 NOTE — ASSESSMENT & PLAN NOTE
-Patient initially hypertensive, pain is likely contributing as well as not taking home antihypertensive.  -Was previously placed on amlodipine but reports he stopped taking it because he had headaches, agreeable to trying different medication.  -Start lisinopril 5mg daily

## 2022-01-23 NOTE — PLAN OF CARE
Hospital Medicine Plan of Care Note    Admission H&P dated earlier this morning reviewed, and agree with assessment and plan as documented. Pt seen and examined this morning on rounds, SHANTELLE.     NSGY without plans for intervention. Optimizing pain control, medrol added per NSGY recommendations. PT/OT evaluation today. Continuing to monitor.    Kahlil Thomas MD  Attending Physician  Department of Hospital Medicine  Epic secure chat preferred, or [employer]Link ext. [phone]  1/23/2022

## 2022-01-23 NOTE — ASSESSMENT & PLAN NOTE
Patient found to be COVID-19 positive on routine screen prior to transfer. Asymptomatic. CXR negative for acute process. Patient does not meet criteria for treatment with decadron or remdesivir at this time.  - COVID-19 testing 1/22/2022  - Infection Control notified     - Isolation:   - Airborne, Contact and Droplet Precautions  - Cohort patients into COVID units  - N95 mask, wear eye protection  - 20 second hand hygiene              - Limit visitors per hospital policy              - Consolidating lab draws, nursing care, provider visits, and interventions    - Diagnostics: (leukopenia, hyponatremia, hyperferritinemia, elevated troponin, elevated d-dimer, age, and comorbidities are significant predictors of poor clinical outcome)  CBC, CMP, Ferritin, CRP, LDH, BNP, Troponin, Portable CXR and UA and culture    - Management:  Supplemental O2 to maintain SpO2 >92%  Telemetry  Continuous/intermittent Pulse Ox

## 2022-01-23 NOTE — SUBJECTIVE & OBJECTIVE
Past Medical History:   Diagnosis Date    Chronic back pain        Past Surgical History:   Procedure Laterality Date    APPENDECTOMY  1996       Review of patient's allergies indicates:  No Known Allergies    Current Facility-Administered Medications on File Prior to Encounter   Medication    [COMPLETED] ketorolac injection 15 mg    [COMPLETED] morphine injection 2 mg    [COMPLETED] morphine injection 4 mg    [COMPLETED] ondansetron injection 4 mg    [COMPLETED] tiZANidine tablet 4 mg     Current Outpatient Medications on File Prior to Encounter   Medication Sig    amLODIPine (NORVASC) 5 MG tablet Take 1 tablet (5 mg total) by mouth once daily.    gabapentin (NEURONTIN) 300 MG capsule Take 1 capsule (300 mg total) by mouth 3 (three) times daily.    HYDROcodone-acetaminophen (NORCO) 5-325 mg per tablet Take 1 tablet by mouth every 8 (eight) hours as needed (breakthrough pain).    ketorolac (TORADOL) 10 mg tablet Take 1 tablet (10 mg total) by mouth every 6 (six) hours as needed for Pain. Do not take with other NSAIDs such as meloxicam, ibuprofen, or naproxen    LIDOcaine (LIDODERM) 5 % Place 1 patch onto the skin once daily. Remove & Discard patch within 12 hours or as directed by MD    meloxicam (MOBIC) 15 MG tablet Take 1 tablet (15 mg total) by mouth once daily.    oxyCODONE-acetaminophen (PERCOCET) 5-325 mg per tablet Take 1 tablet by mouth every 4 (four) hours as needed for Pain.    predniSONE (DELTASONE) 20 MG tablet Take 1 tablet (20 mg total) by mouth once daily.     Family History     Problem Relation (Age of Onset)    Diabetes Father    Heart attack Paternal Grandfather (67)        Tobacco Use    Smoking status: Current Every Day Smoker     Packs/day: 1.00     Types: Cigarettes    Smokeless tobacco: Never Used   Substance and Sexual Activity    Alcohol use: No    Drug use: No    Sexual activity: Yes     Partners: Female     Review of Systems   Constitutional: Negative for appetite  change, chills, fatigue, fever and unexpected weight change.   HENT: Negative for congestion, rhinorrhea, sore throat and trouble swallowing.    Eyes: Negative for photophobia and visual disturbance.   Respiratory: Negative for cough, shortness of breath and wheezing.    Cardiovascular: Negative for chest pain, palpitations and leg swelling.   Gastrointestinal: Negative for abdominal distention, abdominal pain, diarrhea, nausea and vomiting.   Genitourinary: Negative for dysuria, flank pain, frequency and hematuria.   Musculoskeletal: Positive for back pain, gait problem and myalgias. Negative for arthralgias and neck pain.   Skin: Negative for color change and rash.   Neurological: Positive for numbness. Negative for dizziness, speech difficulty, weakness and light-headedness.   Psychiatric/Behavioral: Negative for agitation, confusion and suicidal ideas. The patient is not nervous/anxious.      Objective:     Vital Signs (Most Recent):  Temp: 98.5 °F (36.9 °C) (01/22/22 1944)  Pulse: 92 (01/23/22 0139)  Resp: (!) 21 (01/23/22 0139)  BP: (!) 168/92 (01/23/22 0139)  SpO2: 95 % (01/23/22 0139) Vital Signs (24h Range):  Temp:  [97 °F (36.1 °C)-98.5 °F (36.9 °C)] 98.5 °F (36.9 °C)  Pulse:  [] 92  Resp:  [16-26] 21  SpO2:  [95 %-99 %] 95 %  BP: (145-213)/() 168/92        There is no height or weight on file to calculate BMI.    Physical Exam  Vitals and nursing note reviewed.   Constitutional:       General: He is in acute distress (mild).      Appearance: He is not toxic-appearing or diaphoretic.   HENT:      Head: Normocephalic and atraumatic.      Nose: Nose normal.      Mouth/Throat:      Mouth: Mucous membranes are moist.   Eyes:      Pupils: Pupils are equal, round, and reactive to light.   Cardiovascular:      Rate and Rhythm: Normal rate and regular rhythm.      Heart sounds: No murmur heard.      Pulmonary:      Effort: No respiratory distress.      Breath sounds: No wheezing, rhonchi or rales.    Abdominal:      General: Bowel sounds are normal. There is no distension.      Palpations: Abdomen is soft.      Tenderness: There is no abdominal tenderness. There is no guarding.   Genitourinary:     Comments: deferred  Musculoskeletal:         General: No swelling or deformity.      Cervical back: Normal range of motion. No tenderness.      Thoracic back: No tenderness.      Lumbar back: Spasms present. Decreased range of motion (2/2 pain).        Back:       Comments: Exam limited 2/2 to patient laying on right side due to pain from muscle spasms   Skin:     General: Skin is warm and dry.      Capillary Refill: Capillary refill takes less than 2 seconds.   Neurological:      General: No focal deficit present.      Mental Status: He is alert and oriented to person, place, and time.      Cranial Nerves: No dysarthria or facial asymmetry.      Motor: No weakness or tremor.   Psychiatric:         Mood and Affect: Mood normal.         Behavior: Behavior normal.           CRANIAL NERVES     CN III, IV, VI   Pupils are equal, round, and reactive to light.       Significant Labs:   All pertinent labs within the past 24 hours have been reviewed.  CBC:   Recent Labs   Lab 01/22/22 2038   WBC 9.98   HGB 15.4   HCT 45.7        CMP:   Recent Labs   Lab 01/22/22 2038      K 3.6      CO2 26      BUN 16   CREATININE 0.8   CALCIUM 9.3   PROT 7.2   ALBUMIN 4.1   BILITOT 0.6   ALKPHOS 60   AST 20   ALT 40   ANIONGAP 8   EGFRNONAA >60.0       Significant Imaging: I have reviewed all pertinent imaging results/findings within the past 24 hours.     Imaging Results          X-Ray Chest AP Portable (Final result)  Result time 01/22/22 23:54:46    Final result by Cirilo Cardozo MD (01/22/22 23:54:46)                 Impression:      No acute process.      Electronically signed by: Cirilo Cardozo MD  Date:    01/22/2022  Time:    23:54             Narrative:    EXAMINATION:  XR CHEST AP PORTABLE    CLINICAL  HISTORY:  covid;    TECHNIQUE:  Single frontal view of the chest was performed.    COMPARISON:  None    FINDINGS:  Monitoring EKG leads are present.  The trachea is unremarkable.  The cardiomediastinal silhouette is within normal limits.  The hemidiaphragms are unremarkable.  There are no pleural effusions.  There is no evidence of a pneumothorax.  There is no evidence of pneumomediastinum.  No airspace opacity is present.  The osseous structures are unremarkable.                                MRI Lumbar Spine Without Contrast (Final result)  Result time 01/22/22 22:08:35    Final result by Deandre Miranda MD (01/22/22 22:08:35)                 Impression:      Lower lumbar spondylosis with L4-5 left paracentral inferior disc extrusion extending 1.0 cm caudal to the L5 superior endplate resulting in mild spinal canal narrowing and bilateral moderate neural foraminal narrowing.  Extrusion compresses the exiting L4 nerve and closely approximates the descending L5 nerve root which likely contributes to patient's reported symptoms.    This report was flagged in Epic as abnormal.    Electronically signed by resident: Dian Francisco  Date:    01/22/2022  Time:    21:30    Electronically signed by: Deandre Miranda  Date:    01/22/2022  Time:    22:08             Narrative:    EXAMINATION:  MRI LUMBAR SPINE WITHOUT CONTRAST    CLINICAL HISTORY:  Lumbar radiculopathy, symptoms persist with conservative treatment;    TECHNIQUE:  Multiplanar, multisequence MR images were acquired from the thoracolumbar junction to the sacrum without contrast.    COMPARISON:  Lumbar spine radiograph 12/27/2021    FINDINGS:  The demonstrated portion of the spinal cord is normal in signal intensity at all levels with no indication of myelomalacia or cord edema. Conus terminates at . Evaluation of the surrounding soft tissue structures demonstrates no acute abnormality.    Alignment: L4 on L5 grade 1 retrolisthesis.  L5 on S1 grade 1  retrolisthesis.    Vertebrae: Normal marrow signal. No fracture.  No height loss.    Discs: L4-5 and L5-S1 disc space narrowing and disc desiccation.    Cord: Normal.  Conus terminates at L1.    Degenerative findings:    T12-L1: No significant canal stenosis or neural foraminal narrowing.    L1-L2: No significant canal stenosis or neural foraminal narrowing.    L2-L3: No significant canal stenosis or neural foraminal narrowing.    L3-L4: Mild circumferential disc bulge and bilateral mild facet arthropathy without significant spinal canal stenosis or neural foraminal narrowing.    L4-L5: Circumferential disc bulge with superimposed left paracentral caudal disc extrusion that extends 1.0 cm inferiorly from the L5 superior endplate, bilateral ligamentum flavum hypertrophy and facet arthropathy result in mild spinal canal narrowing and bilateral moderate neural foraminal narrowing.  Disc extrusion compresses the exiting left L4 nerve and closely approximates the left descending L5 nerve root.    L5-S1: Broad-based posterior disc bulge asymmetric to the right and bilateral facet arthropathy result in bilateral mild neural foraminal narrowing.    Paraspinal muscles & soft tissues: Subcentimeter simple left renal cyst.

## 2022-01-23 NOTE — ED NOTES
Telemetry called at [phone], monitor tech notified that the patient was being changed from the EDOU box to [place]TX [id number] at this time as he will be going to [phone].  Transport has been requested.

## 2022-01-23 NOTE — H&P
[facility] - Emergency Dept  [facility] Medicine  History & Physical    Patient Name: Varun Graham Sr.  MRN: [id number]  Patient Class: OP- Observation  Admission Date: 1/22/2022  Attending Physician: Kahlil Thomas MD   Primary Care Provider: Lamont Venegas MD         Patient information was obtained from patient, spouse/SO, past medical records and ER records.     Subjective:     Principal Problem:Other spondylosis with radiculopathy, lumbar region    Chief Complaint:   Chief Complaint   Patient presents with    Transfer     From [place] for MRI. Hx of chronic back pain but injured back in November but hurt his back today on his inversion table and now unable to walk d/t the pain        HPI: Varun Graham Sr. Is a  42 y.o. male with a PMHx of HTN and chronic lower back pain who transferred from [facility] ED for MRI lumbar spine and neurosurgery evaluation. The patient reports lower back pain beginning in November that worsened a few weeks ago. He reports being seen by [facility] and PT and MRI spine were recommended at that time. He was unable to get insurance approval for MRI due to not completing physical therapy prior. He received a steroid injection on Wednesday of last week and reports feeling great after and being pain free for several days. He states that Friday evening as he was walking from his truck he began experiencing 10/10 shooting pain. He describes the the pain as shooting pain to the left side of his lower back radiating down his left leg from his groin to heel. He endorses decreased sensation to his left foot which is unchanged. He also endorses intermittent muscle spasms in his lower back as well. He reports the pain has been so severe that he is unable to walk at this point. He states certain positions make the pain worse and the steroid injection was the only thing that provided relief. He has tried gabapentin, robaxin, zanaflex, percocet and prednisone with  limited benefit.The patient denies any bowel or bladder incontinence or saddle anesthesia. The patient denies any fever, chills, shortness of breath, cough, chest pain, abdominal pain, dizziness, lightheadedness, or dysuria.    In the ED, patient hypertensive and mildly tachypneic in the ED, pain likely contributing. Otherwise VSSAF. CBC and CMP unremarkable. CXR with no acute process. COVID-19 positive. MRI lumbar spine with lower lumbar spondylosis with L4-5 left paracentral inferior disc extrusion extending 1.0 cm caudal to the L5 superior endplate resulting in mild spinal canal narrowing and bilateral moderate neural foraminal narrowing.  Extrusion compresses the exiting L4 nerve and closely approximates the descending L5 nerve root which likely contributes to patient's reported symptoms. NSGY initially accepted the patient for transfer but reviewed imaging and recommended no surgical intervention and observation under hospital medicine for pain control and PT/OT.      Past Medical History:   Diagnosis Date    Chronic back pain        Past Surgical History:   Procedure Laterality Date    APPENDECTOMY  1996       Review of patient's allergies indicates:  No Known Allergies    Current Facility-Administered Medications on File Prior to Encounter   Medication    [COMPLETED] ketorolac injection 15 mg    [COMPLETED] morphine injection 2 mg    [COMPLETED] morphine injection 4 mg    [COMPLETED] ondansetron injection 4 mg    [COMPLETED] tiZANidine tablet 4 mg     Current Outpatient Medications on File Prior to Encounter   Medication Sig    amLODIPine (NORVASC) 5 MG tablet Take 1 tablet (5 mg total) by mouth once daily.    gabapentin (NEURONTIN) 300 MG capsule Take 1 capsule (300 mg total) by mouth 3 (three) times daily.    HYDROcodone-acetaminophen (NORCO) 5-325 mg per tablet Take 1 tablet by mouth every 8 (eight) hours as needed (breakthrough pain).    ketorolac (TORADOL) 10 mg tablet Take 1 tablet (10 mg  total) by mouth every 6 (six) hours as needed for Pain. Do not take with other NSAIDs such as meloxicam, ibuprofen, or naproxen    LIDOcaine (LIDODERM) 5 % Place 1 patch onto the skin once daily. Remove & Discard patch within 12 hours or as directed by MD    meloxicam (MOBIC) 15 MG tablet Take 1 tablet (15 mg total) by mouth once daily.    oxyCODONE-acetaminophen (PERCOCET) 5-325 mg per tablet Take 1 tablet by mouth every 4 (four) hours as needed for Pain.    predniSONE (DELTASONE) 20 MG tablet Take 1 tablet (20 mg total) by mouth once daily.     Family History     Problem Relation (Age of Onset)    Diabetes Father    Heart attack Paternal Grandfather (67)        Tobacco Use    Smoking status: Current Every Day Smoker     Packs/day: 1.00     Types: Cigarettes    Smokeless tobacco: Never Used   Substance and Sexual Activity    Alcohol use: No    Drug use: No    Sexual activity: Yes     Partners: Female     Review of Systems   Constitutional: Negative for appetite change, chills, fatigue, fever and unexpected weight change.   HENT: Negative for congestion, rhinorrhea, sore throat and trouble swallowing.    Eyes: Negative for photophobia and visual disturbance.   Respiratory: Negative for cough, shortness of breath and wheezing.    Cardiovascular: Negative for chest pain, palpitations and leg swelling.   Gastrointestinal: Negative for abdominal distention, abdominal pain, diarrhea, nausea and vomiting.   Genitourinary: Negative for dysuria, flank pain, frequency and hematuria.   Musculoskeletal: Positive for back pain, gait problem and myalgias. Negative for arthralgias and neck pain.   Skin: Negative for color change and rash.   Neurological: Positive for numbness. Negative for dizziness, speech difficulty, weakness and light-headedness.   Psychiatric/Behavioral: Negative for agitation, confusion and suicidal ideas. The patient is not nervous/anxious.      Objective:     Vital Signs (Most Recent):  Temp: 98.5  °F (36.9 °C) (01/22/22 1944)  Pulse: 92 (01/23/22 0139)  Resp: (!) 21 (01/23/22 0139)  BP: (!) 168/92 (01/23/22 0139)  SpO2: 95 % (01/23/22 0139) Vital Signs (24h Range):  Temp:  [97 °F (36.1 °C)-98.5 °F (36.9 °C)] 98.5 °F (36.9 °C)  Pulse:  [] 92  Resp:  [16-26] 21  SpO2:  [95 %-99 %] 95 %  BP: (145-213)/() 168/92        There is no height or weight on file to calculate BMI.    Physical Exam  Vitals and nursing note reviewed.   Constitutional:       General: He is in acute distress (mild).      Appearance: He is not toxic-appearing or diaphoretic.   HENT:      Head: Normocephalic and atraumatic.      Nose: Nose normal.      Mouth/Throat:      Mouth: Mucous membranes are moist.   Eyes:      Pupils: Pupils are equal, round, and reactive to light.   Cardiovascular:      Rate and Rhythm: Normal rate and regular rhythm.      Heart sounds: No murmur heard.      Pulmonary:      Effort: No respiratory distress.      Breath sounds: No wheezing, rhonchi or rales.   Abdominal:      General: Bowel sounds are normal. There is no distension.      Palpations: Abdomen is soft.      Tenderness: There is no abdominal tenderness. There is no guarding.   Genitourinary:     Comments: deferred  Musculoskeletal:         General: No swelling or deformity.      Cervical back: Normal range of motion. No tenderness.      Thoracic back: No tenderness.      Lumbar back: Spasms present. Decreased range of motion (2/2 pain).        Back:       Comments: Exam limited 2/2 to patient laying on right side due to pain from muscle spasms   Skin:     General: Skin is warm and dry.      Capillary Refill: Capillary refill takes less than 2 seconds.   Neurological:      General: No focal deficit present.      Mental Status: He is alert and oriented to person, place, and time.      Cranial Nerves: No dysarthria or facial asymmetry.      Motor: No weakness or tremor.   Psychiatric:         Mood and Affect: Mood normal.         Behavior: Behavior  normal.           CRANIAL NERVES     CN III, IV, VI   Pupils are equal, round, and reactive to light.       Significant Labs:   All pertinent labs within the past 24 hours have been reviewed.  CBC:   Recent Labs   Lab 01/22/22 2038   WBC 9.98   HGB 15.4   HCT 45.7        CMP:   Recent Labs   Lab 01/22/22 2038      K 3.6      CO2 26      BUN 16   CREATININE 0.8   CALCIUM 9.3   PROT 7.2   ALBUMIN 4.1   BILITOT 0.6   ALKPHOS 60   AST 20   ALT 40   ANIONGAP 8   EGFRNONAA >60.0       Significant Imaging: I have reviewed all pertinent imaging results/findings within the past 24 hours.     Imaging Results          X-Ray Chest AP Portable (Final result)  Result time 01/22/22 23:54:46    Final result by Cirilo Cardozo MD (01/22/22 23:54:46)                 Impression:      No acute process.      Electronically signed by: Cirilo Cardozo MD  Date:    01/22/2022  Time:    23:54             Narrative:    EXAMINATION:  XR CHEST AP PORTABLE    CLINICAL HISTORY:  covid;    TECHNIQUE:  Single frontal view of the chest was performed.    COMPARISON:  None    FINDINGS:  Monitoring EKG leads are present.  The trachea is unremarkable.  The cardiomediastinal silhouette is within normal limits.  The hemidiaphragms are unremarkable.  There are no pleural effusions.  There is no evidence of a pneumothorax.  There is no evidence of pneumomediastinum.  No airspace opacity is present.  The osseous structures are unremarkable.                                MRI Lumbar Spine Without Contrast (Final result)  Result time 01/22/22 22:08:35    Final result by Deandre Miranda MD (01/22/22 22:08:35)                 Impression:      Lower lumbar spondylosis with L4-5 left paracentral inferior disc extrusion extending 1.0 cm caudal to the L5 superior endplate resulting in mild spinal canal narrowing and bilateral moderate neural foraminal narrowing.  Extrusion compresses the exiting L4 nerve and closely approximates the  descending L5 nerve root which likely contributes to patient's reported symptoms.    This report was flagged in Epic as abnormal.    Electronically signed by resident: Dian Francisco  Date:    01/22/2022  Time:    21:30    Electronically signed by: Deandre Miranda  Date:    01/22/2022  Time:    22:08             Narrative:    EXAMINATION:  MRI LUMBAR SPINE WITHOUT CONTRAST    CLINICAL HISTORY:  Lumbar radiculopathy, symptoms persist with conservative treatment;    TECHNIQUE:  Multiplanar, multisequence MR images were acquired from the thoracolumbar junction to the sacrum without contrast.    COMPARISON:  Lumbar spine radiograph 12/27/2021    FINDINGS:  The demonstrated portion of the spinal cord is normal in signal intensity at all levels with no indication of myelomalacia or cord edema. Conus terminates at . Evaluation of the surrounding soft tissue structures demonstrates no acute abnormality.    Alignment: L4 on L5 grade 1 retrolisthesis.  L5 on S1 grade 1 retrolisthesis.    Vertebrae: Normal marrow signal. No fracture.  No height loss.    Discs: L4-5 and L5-S1 disc space narrowing and disc desiccation.    Cord: Normal.  Conus terminates at L1.    Degenerative findings:    T12-L1: No significant canal stenosis or neural foraminal narrowing.    L1-L2: No significant canal stenosis or neural foraminal narrowing.    L2-L3: No significant canal stenosis or neural foraminal narrowing.    L3-L4: Mild circumferential disc bulge and bilateral mild facet arthropathy without significant spinal canal stenosis or neural foraminal narrowing.    L4-L5: Circumferential disc bulge with superimposed left paracentral caudal disc extrusion that extends 1.0 cm inferiorly from the L5 superior endplate, bilateral ligamentum flavum hypertrophy and facet arthropathy result in mild spinal canal narrowing and bilateral moderate neural foraminal narrowing.  Disc extrusion compresses the exiting left L4 nerve and closely approximates the  left descending L5 nerve root.    L5-S1: Broad-based posterior disc bulge asymmetric to the right and bilateral facet arthropathy result in bilateral mild neural foraminal narrowing.    Paraspinal muscles & soft tissues: Subcentimeter simple left renal cyst.                                  Assessment/Plan:     * Other spondylosis with radiculopathy, lumbar region  Acute on chronic issue. He reports the back pain began in November and worsened a few weeks ago. He received a steroid injection on Wednesday of last week and reports feeling great after and being pain free for several days. He states that Friday evening as he was walking from his truck he began experiencing 10/10 shooting pain. He describes the the pain as shooting pain to the left side of his lower back radiating down his left leg from his groin to heel. He endorses decreased sensation to his left foot which is unchanged. He also endorses intermittent muscle spasms in his lower back as well. He reports the pain has been so severe that he is unable to walk at this point. Per ED provider NSGY recommends no surgical intervention at this time but no formal consult note in the chart.     -Reviewed MRI-Lower lumbar spondylosis with L4-5 left paracentral inferior disc extrusion extending 1.0 cm caudal to the L5 superior endplate resulting in mild spinal canal narrowing and bilateral moderate neural foraminal narrowing.  Extrusion compresses the exiting L4 nerve and closely approximates the descending L5 nerve root which likely contributes to patient's reported symptoms.  -Add scheduled robaxin 1,000mg qid, scheduled toradol 15mg q6hrs, tylenol 1,000mg q8hrs, and lidocaine patch  -Increased gabapentin from 300mg tid to 600mg tid  -PRN oxy IR for breakthrough pain  -PT/OT    HTN (hypertension)  -Patient initially hypertensive, pain is likely contributing as well as not taking home antihypertensive.  -Was previously placed on amlodipine but reports he stopped  taking it because he had headaches, agreeable to trying different medication.  -Start lisinopril 5mg daily    COVID-19 virus infection  Patient found to be COVID-19 positive on routine screen prior to transfer. Asymptomatic. CXR negative for acute process. Patient does not meet criteria for treatment with decadron or remdesivir at this time.  - COVID-19 testing 1/22/2022  - Infection Control notified     - Isolation:   - Airborne, Contact and Droplet Precautions  - Cohort patients into COVID units  - N95 mask, wear eye protection  - 20 second hand hygiene              - Limit visitors per hospital policy              - Consolidating lab draws, nursing care, provider visits, and interventions    - Diagnostics: (leukopenia, hyponatremia, hyperferritinemia, elevated troponin, elevated d-dimer, age, and comorbidities are significant predictors of poor clinical outcome)  CBC, CMP, Ferritin, CRP, LDH, BNP, Troponin, Portable CXR and UA and culture    - Management:  Supplemental O2 to maintain SpO2 >92%  Telemetry  Continuous/intermittent Pulse Ox      VTE Risk Mitigation (From admission, onward)         Ordered     enoxaparin injection 40 mg  Every 24 hours (non-standard times)         01/23/22 0310                   Meg Murrell NP  Department of Hospital Medicine   [facility] - Emergency Dept

## 2022-01-23 NOTE — PLAN OF CARE
PT evaluation complete and appropriate goals established.    2022    Problem: Physical Therapy Goal  Goal: Physical Therapy Goal  Description: Goals to be met by: 2022     Patient will increase functional independence with mobility by performin. Supine to sit with Supervision  2. Sit to stand transfer with Supervision  3. Gait  x 150 feet with Supervision without AD or using LRAD as needed.   4. Ascend/descend 1 flight of stairs with Handrails Stand-by Assistance.  5. Lower extremity exercise program x15 reps, with supervision, in order to increase LE strength and (I) with functional mobility.      Outcome: Ongoing, Progressing

## 2022-01-23 NOTE — HPI
Pt is 42M with chronic low back pain who complains of one day worsening acute low back pain after turning body awkwardly while getting into car. Two months ago patient was moving table with sudden increase in back pain and has had complaints of mild left big toe weakness and pain down left leg. MRI L spine shows L4-L5 mild spondylosis with herniation of disc paracentrally with caudal migration causing moderate neurforaminal stenosis L>R. NSGY reconsulted for evaluation given patient's significant pain.

## 2022-01-23 NOTE — ED NOTES
0210 - Patient to room 7 from main ED at this time.  Denies any needs.  Respirations ENL no distress.      0300 - Respirations ENL. Will continue to monitor.

## 2022-01-23 NOTE — ED NOTES
LOC: The patient is awake, alert, and oriented to self, place, time, and situation. Pt is calm and cooperative. Affect is appropriate.  Speech is appropriate and clear.     APPEARANCE: Patient resting comfortably in no acute distress.  Patient is clean and well groomed.    SKIN: The skin is warm and dry; color consistent with ethnicity.  Patient has normal skin turgor and moist mucus membranes.  Skin intact; no breakdown or bruising noted.     MUSCULOSKELETAL: Patient moving upper extremities without difficulty. Pt reports pain and weakness in the lower back and legs.    RESPIRATORY: Airway is open and patent. Respirations spontaneous, even, easy, and non-labored.  Patient has a normal effort and rate.  No accessory muscle use noted. Denies cough.     CARDIAC:  Normal rate noted.  No peripheral edema noted. No complaints of chest pain.      ABDOMEN: Soft and non tender to palpation.  No distention noted. Pt denies abdominal pain; denies nausea, vomiting, diarrhea, or constipation.    NEUROLOGIC: Eyes open spontaneously.  Behavior appropriate to situation.  Follows commands; facial expression symmetrical.  Purposeful motor response noted; normal sensation in all extremities. Pt denies headache; denies lightheadedness or dizziness; denies visual disturbances; denies loss of balance; denies unilateral weakness.

## 2022-01-23 NOTE — HPI
Varun Graham  Is a  42 y.o. male with a PMHx of HTN and chronic lower back pain who transferred from [facility] ED for MRI lumbar spine and neurosurgery evaluation. The patient reports lower back pain beginning in November that worsened a few weeks ago. He reports being seen by [facility] and PT and MRI spine were recommended at that time. He was unable to get insurance approval for MRI due to not completing physical therapy prior. He received a steroid injection on Wednesday of last week and reports feeling great after and being pain free for several days. He states that Friday evening as he was walking from his truck he began experiencing 10/10 shooting pain. He describes the the pain as shooting pain to the left side of his lower back radiating down his left leg from his groin to heel. He endorses decreased sensation to his left foot which is unchanged. He also endorses intermittent muscle spasms in his lower back as well. He reports the pain has been so severe that he is unable to walk at this point. He states certain positions make the pain worse and the steroid injection was the only thing that provided relief. He has tried gabapentin, robaxin, zanaflex, percocet and prednisone with limited benefit.The patient denies any bowel or bladder incontinence or saddle anesthesia. The patient denies any fever, chills, shortness of breath, cough, chest pain, abdominal pain, dizziness, lightheadedness, or dysuria.    In the ED, patient hypertensive and mildly tachypneic in the ED, pain likely contributing. Otherwise VSSAF. CBC and CMP unremarkable. CXR with no acute process. COVID-19 positive. MRI lumbar spine with lower lumbar spondylosis with L4-5 left paracentral inferior disc extrusion extending 1.0 cm caudal to the L5 superior endplate resulting in mild spinal canal narrowing and bilateral moderate neural foraminal narrowing.  Extrusion compresses the exiting L4 nerve and closely approximates  the descending L5 nerve root which likely contributes to patient's reported symptoms. NSGY initially accepted the patient for transfer but reviewed imaging and recommended no surgical intervention and observation under hospital medicine for pain control and PT/OT.

## 2022-01-23 NOTE — ASSESSMENT & PLAN NOTE
Pt is 42M with chronic low back pain who complains of one day worsening acute low back pain after turning body awkwardly while getting into car. Two months ago patient was oving table with sudden increase in back pain and has had complaints of mild left big toe weakness and pain down left leg. MRI L spine shows L4-L5 mild spondylosis with herniation of disc paracentrally causing moderate neurforaminal stenosis L>R. NSGY consulted for evaluation.    Plan:  No emergent surgical intervention indicated  Recommend HM workup and acute pain control  Imaging reviewed: MRI L spine shows L4-L5 mild spondylosis with herniation of disc paracentrally causing moderate neurforaminal stenosis L>R.  Recommend pain control, medrol dose pack, PTOT once stable  CBC, CMP, coags, T&S and NPO at this time  Further recs to follow. Please contact resident/CYNTHIA on call with any questions or cocnerns

## 2022-01-23 NOTE — ASSESSMENT & PLAN NOTE
Acute on chronic issue. He reports the back pain began in November and worsened a few weeks ago. He received a steroid injection on Wednesday of last week and reports feeling great after and being pain free for several days. He states that Friday evening as he was walking from his truck he began experiencing 10/10 shooting pain. He describes the the pain as shooting pain to the left side of his lower back radiating down his left leg from his groin to heel. He endorses decreased sensation to his left foot which is unchanged. He also endorses intermittent muscle spasms in his lower back as well. He reports the pain has been so severe that he is unable to walk at this point. Per ED provider NSGY recommends no surgical intervention at this time but no formal consult note in the chart.     -Reviewed MRI-Lower lumbar spondylosis with L4-5 left paracentral inferior disc extrusion extending 1.0 cm caudal to the L5 superior endplate resulting in mild spinal canal narrowing and bilateral moderate neural foraminal narrowing.  Extrusion compresses the exiting L4 nerve and closely approximates the descending L5 nerve root which likely contributes to patient's reported symptoms.  -Add scheduled robaxin 1,000mg qid, scheduled toradol 15mg q6hrs, tylenol 1,000mg q8hrs, and lidocaine patch  -Increased gabapentin from 300mg tid to 600mg tid  -PRN oxy IR for breakthrough pain  -PT/OT

## 2022-01-23 NOTE — PT/OT/SLP PROGRESS
Occupational Therapy      Patient Name:  Varun Graham    MRN:  [id number]    Patient not seen today secondary to Other (Comment) (Pt with RN on first attempt and on second attempt pt still need pain medication). Will follow-up for OT evaluation.    1/23/2022

## 2022-01-23 NOTE — CONSULTS
[facility] Emergency Dept  Neurosurgery  Consult Note    Consults  Subjective:     Chief Complaint/Reason for Admission: sudden back pain    History of Present Illness: Pt is 42M with chronic low back pain who complains of one day worsening acute low back pain after turning body awkwardly while getting into car. Two months ago patient was oving table with sudden increase in back pain and has had complaints of mild left big toe weakness and pain down left leg. MRI L spine shows L4-L5 mild spondylosis with herniation of disc paracentrally causing moderate neurforaminal stenosis L>R. NSGY consulted for evaluation.      Medications Prior to Admission   Medication Sig Dispense Refill Last Dose    amLODIPine (NORVASC) 5 MG tablet Take 1 tablet (5 mg total) by mouth once daily. 30 tablet 5     gabapentin (NEURONTIN) 300 MG capsule Take 1 capsule (300 mg total) by mouth 3 (three) times daily. 30 capsule 1     HYDROcodone-acetaminophen (NORCO) 5-325 mg per tablet Take 1 tablet by mouth every 8 (eight) hours as needed (breakthrough pain). 15 tablet 0     ketorolac (TORADOL) 10 mg tablet Take 1 tablet (10 mg total) by mouth every 6 (six) hours as needed for Pain. Do not take with other NSAIDs such as meloxicam, ibuprofen, or naproxen 12 tablet 0     LIDOcaine (LIDODERM) 5 % Place 1 patch onto the skin once daily. Remove & Discard patch within 12 hours or as directed by MD 30 patch 0     meloxicam (MOBIC) 15 MG tablet Take 1 tablet (15 mg total) by mouth once daily. 30 tablet 1     oxyCODONE-acetaminophen (PERCOCET) 5-325 mg per tablet Take 1 tablet by mouth every 4 (four) hours as needed for Pain. 15 tablet 0     predniSONE (DELTASONE) 20 MG tablet Take 1 tablet (20 mg total) by mouth once daily. 5 tablet 0        Review of patient's allergies indicates:  No Known Allergies    Past Medical History:   Diagnosis Date    Chronic back pain      Past Surgical History:   Procedure Laterality Date    APPENDECTOMY  1996      Family History     Problem Relation (Age of Onset)    Diabetes Father    Heart attack Paternal Grandfather (67)        Tobacco Use    Smoking status: Current Every Day Smoker     Packs/day: 1.00     Types: Cigarettes    Smokeless tobacco: Never Used   Substance and Sexual Activity    Alcohol use: No    Drug use: No    Sexual activity: Yes     Partners: Female     Review of Systems   Constitutional: Negative for activity change and fatigue.   HENT: Negative for postnasal drip and trouble swallowing.    Eyes: Negative for photophobia and visual disturbance.   Respiratory: Negative for shortness of breath.    Cardiovascular: Negative for chest pain.   Gastrointestinal: Negative for nausea and vomiting.   Genitourinary: Negative for decreased urine volume and difficulty urinating.   Musculoskeletal: Positive for back pain. Negative for neck pain and neck stiffness.   Neurological: Positive for weakness (left big toe). Negative for numbness and headaches.   Psychiatric/Behavioral: Negative for confusion.     Objective:     Weight: 93 kg (205 lb)  Body mass index is 29.41 kg/m².  Vital Signs (Most Recent):  Temp: 97.7 °F (36.5 °C) (01/24/22 0830)  Pulse: 80 (01/24/22 0830)  Resp: 18 (01/24/22 0930)  BP: (!) 141/91 (01/24/22 0830)  SpO2: 98 % (01/24/22 0830) Vital Signs (24h Range):  Temp:  [97.7 °F (36.5 °C)-98.6 °F (37 °C)] 97.7 °F (36.5 °C)  Pulse:  [] 80  Resp:  [16-22] 18  SpO2:  [92 %-98 %] 98 %  BP: (113-154)/(59-98) 141/91                          Neurosurgery Physical Exam  General: well developed, well nourished, no distress.   Head: normocephalic, atraumatic  Neurologic: Alert and oriented. Thought content appropriate.  GCS: Motor: 6/Verbal: 5/Eyes: 4 GCS Total: 15  Mental Status: Awake, Alert, Oriented x 4  Language: No aphasia  Speech: No dysarthria  Cranial nerves: face symmetric, tongue midline, CN II-XII grossly intact.   Eyes: pupils equal, round, reactive to light with accommodation, EOMI.    Pulmonary: normal respirations, no signs of respiratory distress  Abdomen: soft, non-distended, not tender to palpation  Skin: Skin is warm, dry and intact.  Sensory: intact to light touch throughout, with L5 sensory dermatomal radiculopathy    Motor Strength:Moves all extremities spontaneously with good tone.  Full strength upper and lower extremities. No abnormal movements seen.     Strength  Deltoids Triceps Biceps Wrist Extension Wrist Flexion Hand    Upper: R 5/5 5/5 5/5 5/5 5/5 5/5    L 5/5 5/5 5/5 5/5 5/5 5/5     Iliopsoas Quadriceps Knee  Flexion Tibialis  anterior Gastro- cnemius EHL   Lower: R 5/5 5/5 5/5 5/5 5/5 5/5    L 5/5 5/5 5/5 5/5 5/5 4/5     Reflexes:   DTR: 2+ symmetrically throughout.  Harris's: Negative.  Babinski's: Negative.  Clonus: Negative.      Significant Labs:  Recent Labs   Lab 01/22/22 2038 01/23/22  0405 01/24/22  0851    198* 98    136 137   K 3.6 4.2 4.4    103 104   CO2 26 22* 26   BUN 16 18 22*   CREATININE 0.8 1.0 1.0   CALCIUM 9.3 9.5 9.5   MG  --  1.8 2.0     Recent Labs   Lab 01/22/22 2038 01/23/22  0404 01/24/22  0851   WBC 9.98 12.09 11.10   HGB 15.4 16.3 16.0   HCT 45.7 47.3 48.2    284 300     Recent Labs   Lab 01/23/22  0405   INR 0.9     Microbiology Results (last 7 days)     ** No results found for the last 168 hours. **        All pertinent labs from the last 24 hours have been reviewed.    Significant Diagnostics:  I have reviewed all pertinent imaging results/findings within the past 24 hours.  I have reviewed and interpreted all pertinent imaging results/findings within the past 24 hours.    Assessment/Plan:     * Other spondylosis with radiculopathy, lumbar region  Pt is 42M with chronic low back pain who complains of one day worsening acute low back pain after turning body awkwardly while getting into car. Two months ago patient was oving table with sudden increase in back pain and has had complaints of mild left big toe weakness  and pain down left leg. MRI L spine shows L4-L5 mild spondylosis with herniation of disc paracentrally causing moderate neurforaminal stenosis L>R. NSGY consulted for evaluation.    Plan:  No emergent surgical intervention indicated  Recommend HM workup and acute pain control  Imaging reviewed: MRI L spine shows L4-L5 mild spondylosis with herniation of disc paracentrally causing moderate neurforaminal stenosis L>R.  Recommend pain control, medrol dose pack, PTOT once stable  CBC, CMP, coags, T&S and NPO at this time  Further recs to follow. Please contact resident/CYNTHIA on call with any questions or cocnerns      Thank you for your consult. I will follow-up with patient. Please contact us if you have any additional questions.    Tahir Duarte MD  Neurosurgery  [facility]

## 2022-01-24 LAB
ALBUMIN SERPL BCP-MCNC: 3.7 G/DL (ref 3.5–5.2)
ALP SERPL-CCNC: 53 U/L (ref 55–135)
ALT SERPL W/O P-5'-P-CCNC: 23 U/L (ref 10–44)
ANION GAP SERPL CALC-SCNC: 7 MMOL/L (ref 8–16)
AST SERPL-CCNC: 12 U/L (ref 10–40)
BASOPHILS # BLD AUTO: 0.04 K/UL (ref 0–0.2)
BASOPHILS NFR BLD: 0.4 % (ref 0–1.9)
BILIRUB SERPL-MCNC: 0.3 MG/DL (ref 0.1–1)
BUN SERPL-MCNC: 22 MG/DL (ref 6–20)
CALCIUM SERPL-MCNC: 9.5 MG/DL (ref 8.7–10.5)
CHLORIDE SERPL-SCNC: 104 MMOL/L (ref 95–110)
CO2 SERPL-SCNC: 26 MMOL/L (ref 23–29)
CREAT SERPL-MCNC: 1 MG/DL (ref 0.5–1.4)
DIFFERENTIAL METHOD: ABNORMAL
EOSINOPHIL # BLD AUTO: 0 K/UL (ref 0–0.5)
EOSINOPHIL NFR BLD: 0.1 % (ref 0–8)
ERYTHROCYTE [DISTWIDTH] IN BLOOD BY AUTOMATED COUNT: 12.3 % (ref 11.5–14.5)
EST. GFR  (AFRICAN AMERICAN): >60 ML/MIN/1.73 M^2
EST. GFR  (NON AFRICAN AMERICAN): >60 ML/MIN/1.73 M^2
GLUCOSE SERPL-MCNC: 98 MG/DL (ref 70–110)
HCT VFR BLD AUTO: 48.2 % (ref 40–54)
HGB BLD-MCNC: 16 G/DL (ref 14–18)
IMM GRANULOCYTES # BLD AUTO: 0.06 K/UL (ref 0–0.04)
IMM GRANULOCYTES NFR BLD AUTO: 0.5 % (ref 0–0.5)
LACTATE SERPL-SCNC: 1 MMOL/L (ref 0.5–2.2)
LYMPHOCYTES # BLD AUTO: 2.7 K/UL (ref 1–4.8)
LYMPHOCYTES NFR BLD: 23.9 % (ref 18–48)
MAGNESIUM SERPL-MCNC: 2 MG/DL (ref 1.6–2.6)
MCH RBC QN AUTO: 28 PG (ref 27–31)
MCHC RBC AUTO-ENTMCNC: 33.2 G/DL (ref 32–36)
MCV RBC AUTO: 84 FL (ref 82–98)
MONOCYTES # BLD AUTO: 1 K/UL (ref 0.3–1)
MONOCYTES NFR BLD: 8.9 % (ref 4–15)
NEUTROPHILS # BLD AUTO: 7.4 K/UL (ref 1.8–7.7)
NEUTROPHILS NFR BLD: 66.2 % (ref 38–73)
NRBC BLD-RTO: 0 /100 WBC
PHOSPHATE SERPL-MCNC: 3.7 MG/DL (ref 2.7–4.5)
PLATELET # BLD AUTO: 300 K/UL (ref 150–450)
PMV BLD AUTO: 9.3 FL (ref 9.2–12.9)
POCT GLUCOSE: 132 MG/DL (ref 70–110)
POTASSIUM SERPL-SCNC: 4.4 MMOL/L (ref 3.5–5.1)
PROT SERPL-MCNC: 7.1 G/DL (ref 6–8.4)
RBC # BLD AUTO: 5.72 M/UL (ref 4.6–6.2)
SODIUM SERPL-SCNC: 137 MMOL/L (ref 136–145)
WBC # BLD AUTO: 11.1 K/UL (ref 3.9–12.7)

## 2022-01-24 PROCEDURE — 25000003 PHARM REV CODE 250: Performed by: NURSE PRACTITIONER

## 2022-01-24 PROCEDURE — G0378 HOSPITAL OBSERVATION PER HR: HCPCS

## 2022-01-24 PROCEDURE — 83735 ASSAY OF MAGNESIUM: CPT | Performed by: INTERNAL MEDICINE

## 2022-01-24 PROCEDURE — 97530 THERAPEUTIC ACTIVITIES: CPT

## 2022-01-24 PROCEDURE — 84100 ASSAY OF PHOSPHORUS: CPT | Performed by: INTERNAL MEDICINE

## 2022-01-24 PROCEDURE — 99233 PR SUBSEQUENT HOSPITAL CARE,LEVL III: ICD-10-PCS | Mod: ,,, | Performed by: PHYSICIAN ASSISTANT

## 2022-01-24 PROCEDURE — 99226 PR SUBSEQUENT OBSERVATION CARE,LEVEL III: CPT | Mod: ,,, | Performed by: INTERNAL MEDICINE

## 2022-01-24 PROCEDURE — 36415 COLL VENOUS BLD VENIPUNCTURE: CPT | Performed by: INTERNAL MEDICINE

## 2022-01-24 PROCEDURE — 94761 N-INVAS EAR/PLS OXIMETRY MLT: CPT

## 2022-01-24 PROCEDURE — 99233 SBSQ HOSP IP/OBS HIGH 50: CPT | Mod: ,,, | Performed by: PHYSICIAN ASSISTANT

## 2022-01-24 PROCEDURE — 83605 ASSAY OF LACTIC ACID: CPT | Performed by: INTERNAL MEDICINE

## 2022-01-24 PROCEDURE — 96376 TX/PRO/DX INJ SAME DRUG ADON: CPT

## 2022-01-24 PROCEDURE — 99226 PR SUBSEQUENT OBSERVATION CARE,LEVEL III: ICD-10-PCS | Mod: ,,, | Performed by: INTERNAL MEDICINE

## 2022-01-24 PROCEDURE — 97165 OT EVAL LOW COMPLEX 30 MIN: CPT

## 2022-01-24 PROCEDURE — 63600175 PHARM REV CODE 636 W HCPCS: Performed by: NURSE PRACTITIONER

## 2022-01-24 PROCEDURE — 25000003 PHARM REV CODE 250: Performed by: INTERNAL MEDICINE

## 2022-01-24 PROCEDURE — 80053 COMPREHEN METABOLIC PANEL: CPT | Performed by: INTERNAL MEDICINE

## 2022-01-24 PROCEDURE — 63600175 PHARM REV CODE 636 W HCPCS: Performed by: INTERNAL MEDICINE

## 2022-01-24 PROCEDURE — 85025 COMPLETE CBC W/AUTO DIFF WBC: CPT | Performed by: INTERNAL MEDICINE

## 2022-01-24 PROCEDURE — 99900035 HC TECH TIME PER 15 MIN (STAT)

## 2022-01-24 PROCEDURE — 96372 THER/PROPH/DIAG INJ SC/IM: CPT | Mod: 59

## 2022-01-24 PROCEDURE — 94799 UNLISTED PULMONARY SVC/PX: CPT

## 2022-01-24 PROCEDURE — 63600175 PHARM REV CODE 636 W HCPCS: Performed by: STUDENT IN AN ORGANIZED HEALTH CARE EDUCATION/TRAINING PROGRAM

## 2022-01-24 RX ORDER — AMOXICILLIN 250 MG
1 CAPSULE ORAL DAILY
Status: DISCONTINUED | OUTPATIENT
Start: 2022-01-24 | End: 2022-01-27 | Stop reason: HOSPADM

## 2022-01-24 RX ORDER — OXYCODONE HYDROCHLORIDE 10 MG/1
10 TABLET ORAL EVERY 6 HOURS PRN
Status: DISCONTINUED | OUTPATIENT
Start: 2022-01-24 | End: 2022-01-25

## 2022-01-24 RX ORDER — MORPHINE SULFATE 2 MG/ML
3 INJECTION, SOLUTION INTRAMUSCULAR; INTRAVENOUS
Status: DISCONTINUED | OUTPATIENT
Start: 2022-01-24 | End: 2022-01-25

## 2022-01-24 RX ORDER — DEXAMETHASONE SODIUM PHOSPHATE 4 MG/ML
8 INJECTION, SOLUTION INTRA-ARTICULAR; INTRALESIONAL; INTRAMUSCULAR; INTRAVENOUS; SOFT TISSUE ONCE
Status: COMPLETED | OUTPATIENT
Start: 2022-01-24 | End: 2022-01-24

## 2022-01-24 RX ORDER — POLYETHYLENE GLYCOL 3350 17 G/17G
17 POWDER, FOR SOLUTION ORAL 3 TIMES DAILY PRN
Status: DISCONTINUED | OUTPATIENT
Start: 2022-01-24 | End: 2022-01-27 | Stop reason: HOSPADM

## 2022-01-24 RX ORDER — LIDOCAINE 50 MG/G
3 PATCH TOPICAL
Status: DISCONTINUED | OUTPATIENT
Start: 2022-01-24 | End: 2022-01-27 | Stop reason: HOSPADM

## 2022-01-24 RX ORDER — BACLOFEN 5 MG/1
5 TABLET ORAL 3 TIMES DAILY
Status: DISCONTINUED | OUTPATIENT
Start: 2022-01-24 | End: 2022-01-27 | Stop reason: HOSPADM

## 2022-01-24 RX ADMIN — KETOROLAC TROMETHAMINE 15 MG: 30 INJECTION, SOLUTION INTRAMUSCULAR; INTRAVENOUS at 11:01

## 2022-01-24 RX ADMIN — ACETAMINOPHEN 1000 MG: 500 TABLET ORAL at 03:01

## 2022-01-24 RX ADMIN — ENOXAPARIN SODIUM 40 MG: 100 INJECTION SUBCUTANEOUS at 05:01

## 2022-01-24 RX ADMIN — THERA TABS 1 TABLET: TAB at 09:01

## 2022-01-24 RX ADMIN — OXYCODONE 5 MG: 5 TABLET ORAL at 09:01

## 2022-01-24 RX ADMIN — LISINOPRIL 5 MG: 5 TABLET ORAL at 09:01

## 2022-01-24 RX ADMIN — KETOROLAC TROMETHAMINE 15 MG: 30 INJECTION, SOLUTION INTRAMUSCULAR; INTRAVENOUS at 12:01

## 2022-01-24 RX ADMIN — MORPHINE SULFATE 3 MG: 2 INJECTION, SOLUTION INTRAMUSCULAR; INTRAVENOUS at 05:01

## 2022-01-24 RX ADMIN — METHOCARBAMOL 1000 MG: 500 TABLET ORAL at 12:01

## 2022-01-24 RX ADMIN — GABAPENTIN 600 MG: 300 CAPSULE ORAL at 09:01

## 2022-01-24 RX ADMIN — BACLOFEN 5 MG: 5 TABLET ORAL at 03:01

## 2022-01-24 RX ADMIN — DEXAMETHASONE SODIUM PHOSPHATE 8 MG: 4 INJECTION INTRA-ARTICULAR; INTRALESIONAL; INTRAMUSCULAR; INTRAVENOUS; SOFT TISSUE at 12:01

## 2022-01-24 RX ADMIN — METHOCARBAMOL 1000 MG: 500 TABLET ORAL at 05:01

## 2022-01-24 RX ADMIN — OXYCODONE HYDROCHLORIDE AND ACETAMINOPHEN 500 MG: 500 TABLET ORAL at 08:01

## 2022-01-24 RX ADMIN — GABAPENTIN 600 MG: 300 CAPSULE ORAL at 08:01

## 2022-01-24 RX ADMIN — METHOCARBAMOL 1000 MG: 500 TABLET ORAL at 09:01

## 2022-01-24 RX ADMIN — METHYLPREDNISOLONE 4 MG: 4 TABLET ORAL at 09:01

## 2022-01-24 RX ADMIN — OXYCODONE HYDROCHLORIDE AND ACETAMINOPHEN 500 MG: 500 TABLET ORAL at 09:01

## 2022-01-24 RX ADMIN — OXYCODONE 5 MG: 5 TABLET ORAL at 03:01

## 2022-01-24 RX ADMIN — DOCUSATE SODIUM 50 MG AND SENNOSIDES 8.6 MG 1 TABLET: 8.6; 5 TABLET, FILM COATED ORAL at 11:01

## 2022-01-24 RX ADMIN — MORPHINE SULFATE 2 MG: 2 INJECTION, SOLUTION INTRAMUSCULAR; INTRAVENOUS at 11:01

## 2022-01-24 RX ADMIN — BACLOFEN 5 MG: 5 TABLET ORAL at 12:01

## 2022-01-24 RX ADMIN — METHOCARBAMOL 1000 MG: 500 TABLET ORAL at 08:01

## 2022-01-24 RX ADMIN — KETOROLAC TROMETHAMINE 15 MG: 30 INJECTION, SOLUTION INTRAMUSCULAR; INTRAVENOUS at 06:01

## 2022-01-24 RX ADMIN — LIDOCAINE 5% 1 PATCH: 700 PATCH TOPICAL at 12:01

## 2022-01-24 RX ADMIN — GABAPENTIN 600 MG: 300 CAPSULE ORAL at 03:01

## 2022-01-24 RX ADMIN — POLYETHYLENE GLYCOL 3350 17 G: 17 POWDER, FOR SOLUTION ORAL at 11:01

## 2022-01-24 RX ADMIN — OXYCODONE HYDROCHLORIDE 10 MG: 10 TABLET ORAL at 08:01

## 2022-01-24 RX ADMIN — ACETAMINOPHEN 1000 MG: 500 TABLET ORAL at 10:01

## 2022-01-24 RX ADMIN — LIDOCAINE 5% 3 PATCH: 700 PATCH TOPICAL at 12:01

## 2022-01-24 RX ADMIN — BACLOFEN 5 MG: 5 TABLET ORAL at 08:01

## 2022-01-24 NOTE — PROGRESS NOTES
[facility] ([facility]  Progress Note    Patient Name: Varun Graham Sr.  MRN: [id number]  Patient Class: OP- Observation   Admission Date: 1/22/2022  Length of Stay: 0 days  Attending Physician: Rubin Frausto MD  Primary Care Provider: Lamont Venegas MD        Subjective:     Principal Problem:Other spondylosis with radiculopathy, lumbar region        HPI:  Varun Graham Sr. Is a  42 y.o. male with a PMHx of HTN and chronic lower back pain who transferred from [facility] for MRI lumbar spine and neurosurgery evaluation. The patient reports lower back pain beginning in November that worsened a few weeks ago. He reports being seen by [facility] and PT and MRI spine were recommended at that time. He was unable to get insurance approval for MRI due to not completing physical therapy prior. He received a steroid injection on Wednesday of last week and reports feeling great after and being pain free for several days. He states that Friday evening as he was walking from his truck he began experiencing 10/10 shooting pain. He describes the the pain as shooting pain to the left side of his lower back radiating down his left leg from his groin to heel. He endorses decreased sensation to his left foot which is unchanged. He also endorses intermittent muscle spasms in his lower back as well. He reports the pain has been so severe that he is unable to walk at this point. He states certain positions make the pain worse and the steroid injection was the only thing that provided relief. He has tried gabapentin, robaxin, zanaflex, percocet and prednisone with limited benefit.The patient denies any bowel or bladder incontinence or saddle anesthesia. The patient denies any fever, chills, shortness of breath, cough, chest pain, abdominal pain, dizziness, lightheadedness, or dysuria.    In the ED, patient hypertensive and mildly tachypneic in the ED, pain likely contributing.  Otherwise VSSAF. CBC and CMP unremarkable. CXR with no acute process. COVID-19 positive. MRI lumbar spine with lower lumbar spondylosis with L4-5 left paracentral inferior disc extrusion extending 1.0 cm caudal to the L5 superior endplate resulting in mild spinal canal narrowing and bilateral moderate neural foraminal narrowing.  Extrusion compresses the exiting L4 nerve and closely approximates the descending L5 nerve root which likely contributes to patient's reported symptoms. NSGY initially accepted the patient for transfer but reviewed imaging and recommended no surgical intervention and observation under hospital medicine for pain control and PT/OT.      Overview/Hospital Course:  42 y.o. male with a PMHx of HTN and chronic lower back pain who transferred from [facility] for MRI lumbar spine and neurosurgery evaluation. MRI lumbar spine with lower lumbar spondylosis with L4-5 left paracentral inferior disc extrusion extending 1.0 cm caudal to the L5 superior endplate resulting in mild spinal canal narrowing and bilateral moderate neural foraminal narrowing. Extrusion compresses the exiting L4 nerve and closely approximates the descending L5 nerve root. NSGY consulted, no acute interventions. Pain mgmt recommended.      Interval History: Overnight he was in significant pain, which worsened this AM after PT OT assessment. He continues to have sharp LBP radiating to his left leg. No urinary retention, saddle anesthesia, significant weakness or numbness noted. Discussed with NSGY, they are planning to revisit him today for further recs.    Review of Systems   Constitutional: Negative for appetite change, chills, fatigue, fever and unexpected weight change.   HENT: Negative for congestion, rhinorrhea, sore throat and trouble swallowing.    Eyes: Negative for photophobia and visual disturbance.   Respiratory: Negative for cough, shortness of breath and wheezing.    Cardiovascular: Negative for chest pain,  palpitations and leg swelling.   Gastrointestinal: Negative for abdominal distention, abdominal pain, diarrhea, nausea and vomiting.   Genitourinary: Negative for dysuria, flank pain, frequency and hematuria.   Musculoskeletal: Positive for back pain, gait problem and myalgias. Negative for arthralgias and neck pain.   Skin: Negative for color change and rash.   Neurological: Positive for left leg weakness and radicular pain  Negative for dizziness, speech difficulty, weakness and light-headedness.   Psychiatric/Behavioral: Negative for agitation, confusion and suicidal ideas. The patient is not nervous/anxious.        Objective:     Vital Signs (Most Recent):  Temp: 98 °F (36.7 °C) (01/24/22 1245)  Pulse: 78 (01/24/22 1245)  Resp: 16 (01/24/22 1245)  BP: (!) 142/99 (01/24/22 1245)  SpO2: 99 % (01/24/22 1245) Vital Signs (24h Range):  Temp:  [97.7 °F (36.5 °C)-98.6 °F (37 °C)] 98 °F (36.7 °C)  Pulse:  [] 78  Resp:  [16-22] 16  SpO2:  [92 %-99 %] 99 %  BP: (113-142)/(59-99) 142/99     Weight: 93 kg (205 lb)  Body mass index is 29.41 kg/m².    Intake/Output Summary (Last 24 hours) at 1/24/2022 1503  Last data filed at 1/24/2022 1124  Gross per 24 hour   Intake --   Output 500 ml   Net -500 ml      Physical Exam  Vitals and nursing note reviewed.   Constitutional:       General: He is in acute distress (mild).      Appearance: He is not toxic-appearing or diaphoretic.   HENT:      Head: Normocephalic and atraumatic.      Nose: Nose normal.      Mouth/Throat:      Mouth: Mucous membranes are moist.   Eyes:      Pupils: Pupils are equal, round, and reactive to light.   Cardiovascular:      Rate and Rhythm: Normal rate and regular rhythm.      Heart sounds: No murmur heard.  Pulmonary:      Effort: No respiratory distress.      Breath sounds: No wheezing, rhonchi or rales.   Abdominal:      General: Bowel sounds are normal. There is no distension.      Palpations: Abdomen is soft.      Tenderness: There is no  abdominal tenderness. There is no guarding.   Musculoskeletal:         General: No swelling or deformity.      Cervical back: Normal range of motion. No tenderness.      Thoracic back: No tenderness.      Lumbar back: Point tenderness. Decreased range of motion (2/2 pain).  Comments: Exam limited 2/2 to patient laying on supine due to pain from muscle spasms   Skin:     General: Skin is warm and dry.      Capillary Refill: Capillary refill takes less than 2 seconds.   Neurological:      General: No focal deficit present.      Mental Status: He is alert and oriented to person, place, and time.      Cranial Nerves: No dysarthria or facial asymmetry.      Motor: No weakness or tremor.      Mild sensation deficit on LLE near and below ankle  Psychiatric:         Mood and Affect: Mood normal.         Behavior: Behavior normal.     Significant Labs: All pertinent labs within the past 24 hours have been reviewed.    Significant Imaging: I have reviewed all pertinent imaging results/findings within the past 24 hours.      Assessment/Plan:      * Other spondylosis with radiculopathy, lumbar region  - Acute on chronic issue. He reports the back pain began in November and worsened a few weeks ago. He received a steroid injection on Wednesday of last week and reports feeling great after and being pain free for several days. He states that Friday evening as he was walking from his truck he began experiencing 10/10 shooting pain. He describes the the pain as shooting pain to the left side of his lower back radiating down his left leg from his groin to heel. He endorses decreased sensation to his left foot which is unchanged. He also endorses intermittent muscle spasms in his lower back as well. He reports the pain has been so severe that he is unable to walk at this point. Per ED provider NSGY recommends no surgical intervention at this time  - with significant pain, requested NSGY re-evaluate patient again    -Reviewed MRI-Lower  lumbar spondylosis with L4-5 left paracentral inferior disc extrusion extending 1.0 cm caudal to the L5 superior endplate resulting in mild spinal canal narrowing and bilateral moderate neural foraminal narrowing.  Extrusion compresses the exiting L4 nerve and closely approximates the descending L5 nerve root which likely contributes to patient's reported symptoms.  -Add scheduled robaxin 1,000mg qid, scheduled toradol 15mg q6hrs, tylenol 1,000mg q8hrs, and lidocaine patch  -Increased gabapentin from 300mg tid to 600mg tid  -PRN oxy IR for breakthrough pain  -PT/OT    HTN (hypertension)  -Patient initially hypertensive, pain is likely contributing as well as not taking home antihypertensive.  -Was previously placed on amlodipine but reports he stopped taking it because he had headaches, agreeable to trying different medication.  -continue lisinopril 5mg daily  - pain mgmt     COVID-19 virus infection  Patient found to be COVID-19 positive on routine screen prior to transfer. Asymptomatic and incidental. CXR negative for acute process. Patient does not meet criteria for treatment with decadron or remdesivir at this time.  - COVID-19 testing 1/22/2022  - Infection Control notified   - Isolation:   - Airborne, Contact and Droplet Precautions  - Cohort patients into COVID units  - N95 mask, wear eye protection  - 20 second hand hygiene              - Limit visitors per hospital policy              - Consolidating lab draws, nursing care, provider visits, and interventions    - Diagnostics: (leukopenia, hyponatremia, hyperferritinemia, elevated troponin, elevated d-dimer, age, and comorbidities are significant predictors of poor clinical outcome)  CBC, CMP, Ferritin, CRP, LDH, BNP, Troponin, Portable CXR and UA and culture    - Management:  Supplemental O2 to maintain SpO2 >92%  Telemetry  Continuous/intermittent Pulse Ox      VTE Risk Mitigation (From admission, onward)         Ordered     enoxaparin injection 40 mg   Every 24 hours (non-standard times)         01/23/22 0310     IP VTE HIGH RISK PATIENT  Once         01/23/22 0601     Place sequential compression device  Until discontinued         01/23/22 0601                Discharge Planning   TOM: 1/25/2022     Code Status: Full Code   Is the patient medically ready for discharge?: No    Reason for patient still in hospital (select all that apply): Patient trending condition  Discharge Plan A: Home with family          Rubin Frausto MD  Department of Hospital Medicine   [facility] - Neurosurgery ([facility])

## 2022-01-24 NOTE — ASSESSMENT & PLAN NOTE
Pt is 42M with chronic low back pain who complains of one day worsening acute low back pain after turning body awkwardly while getting into car. Two months ago patient was oving table with sudden increase in back pain and has had complaints of mild left big toe weakness and pain down left leg. MRI L spine shows L4-L5 mild spondylosis with herniation of disc paracentrally causing moderate neurforaminal stenosis L>R. NSGY consulted for evaluation.    Plan:  Admitted to  floor status  No emergent surgical intervention indicated  Recommend decadron 10 mg IV once with continued pain control  Imaging reviewed: MRI L spine shows L4-L5 mild spondylosis with herniation of disc paracentrally causing moderate neurforaminal stenosis L>R  Will discuss with Dr. Irena santiago inpatient vs outpatient management

## 2022-01-24 NOTE — ASSESSMENT & PLAN NOTE
Patient found to be COVID-19 positive on routine screen prior to transfer. Asymptomatic and incidental. CXR negative for acute process. Patient does not meet criteria for treatment with decadron or remdesivir at this time.  - COVID-19 testing 1/22/2022  - Infection Control notified   - Isolation:   - Airborne, Contact and Droplet Precautions  - Cohort patients into COVID units  - N95 mask, wear eye protection  - 20 second hand hygiene              - Limit visitors per hospital policy              - Consolidating lab draws, nursing care, provider visits, and interventions    - Diagnostics: (leukopenia, hyponatremia, hyperferritinemia, elevated troponin, elevated d-dimer, age, and comorbidities are significant predictors of poor clinical outcome)  CBC, CMP, Ferritin, CRP, LDH, BNP, Troponin, Portable CXR and UA and culture    - Management:  Supplemental O2 to maintain SpO2 >92%  Telemetry  Continuous/intermittent Pulse Ox

## 2022-01-24 NOTE — SUBJECTIVE & OBJECTIVE
Interval History: patient was doing 6 min walk test for home O2 when he experienced sudden excruciating left hip and leg pain. Pain previously controlled prior to this and he was ready to discharge. Patient now crying in pain in bed on exam. Pain located in left hip and radiating down the posterolateral left leg down into the ankle and big toe. No numbness or tingling. Denies saddle anesthesia. Report he feels his left foot is weaker than it was. Patient has been voiding spontaneously.     Medications:  Continuous Infusions:  Scheduled Meds:   acetaminophen  1,000 mg Oral Q8H    ascorbic acid (vitamin C)  500 mg Oral BID    baclofen  5 mg Oral TID    enoxaparin  40 mg Subcutaneous Q24H    gabapentin  600 mg Oral TID    LIDOcaine  3 patch Transdermal Q24H    lisinopriL  5 mg Oral Daily    methocarbamoL  1,000 mg Oral QID    methylPREDNISolone  4 mg Oral Daily    multivitamin  1 tablet Oral Daily    senna-docusate 8.6-50 mg  1 tablet Oral Daily     PRN Meds:acetaminophen, dextrose 50%, dextrose 50%, glucagon (human recombinant), glucose, glucose, melatonin, morphine, oxyCODONE, polyethylene glycol, sodium chloride 0.9%, sodium chloride 0.9%     Review of Systems  Objective:     Weight: 93 kg (205 lb)  Body mass index is 29.41 kg/m².  Vital Signs (Most Recent):  Temp: 98 °F (36.7 °C) (01/24/22 1245)  Pulse: 78 (01/24/22 1245)  Resp: 16 (01/24/22 1245)  BP: (!) 142/99 (01/24/22 1245)  SpO2: 99 % (01/24/22 1245) Vital Signs (24h Range):  Temp:  [97.7 °F (36.5 °C)-98.6 °F (37 °C)] 98 °F (36.7 °C)  Pulse:  [] 78  Resp:  [16-22] 16  SpO2:  [92 %-99 %] 99 %  BP: (113-142)/(59-99) 142/99     Date 01/24/22 0700 - 01/25/22 0659   Shift 5898-5342 1580-8491 5661-6731 24 Hour Total   INTAKE   Shift Total(mL/kg)       OUTPUT   Urine(mL/kg/hr) 500(0.7)   500   Shift Total(mL/kg) 500(5.4)   500(5.4)   Weight (kg) 93 93 93 93           Neurosurgery Physical Exam  General: well developed, well nourished, no distress.    Head: normocephalic, atraumatic  Neurologic: Alert and oriented. Thought content appropriate.  GCS: Motor: 6/Verbal: 5/Eyes: 4 GCS Total: 15  Mental Status: Awake, Alert, Oriented x 4  Language: No aphasia  Speech: No dysarthria  Cranial nerves: face symmetric, tongue midline, CN II-XII grossly intact.   Eyes: pupils equal, round, reactive to light with accommodation, EOMI.   Pulmonary: normal respirations, no signs of respiratory distress  Abdomen: soft, non-distended, not tender to palpation  Skin: Skin is warm, dry and intact.  Sensory: intact to light touch throughout  Motor Strength:    Strength  Deltoids Triceps Biceps Wrist Extension Wrist Flexion Hand    Upper: R 5/5 5/5 5/5 5/5 5/5 5/5    L 5/5 5/5 5/5 5/5 5/5 5/5     Iliopsoas Quadriceps Knee  Flexion Tibialis  anterior Gastro- cnemius EHL   Lower: R 4/5 5/5 5/5 5/5 5/5 5/5    L 4/5 5/5 5/5 5/5 5/5 3/5             Significant Labs:  Recent Labs   Lab 01/22/22 2038 01/23/22  0405 01/24/22  0851    198* 98    136 137   K 3.6 4.2 4.4    103 104   CO2 26 22* 26   BUN 16 18 22*   CREATININE 0.8 1.0 1.0   CALCIUM 9.3 9.5 9.5   MG  --  1.8 2.0     Recent Labs   Lab 01/22/22 2038 01/23/22  0404 01/24/22  0851   WBC 9.98 12.09 11.10   HGB 15.4 16.3 16.0   HCT 45.7 47.3 48.2    284 300     Recent Labs   Lab 01/23/22  0405   INR 0.9     Microbiology Results (last 7 days)     ** No results found for the last 168 hours. **        All pertinent labs from the last 24 hours have been reviewed.    Significant Diagnostics:  I independently reviewed the imaging.

## 2022-01-24 NOTE — PLAN OF CARE
Problem: Occupational Therapy Goal  Goal: Occupational Therapy Goal  Description: Goals to be met by: 2/4     Patient will increase functional independence with ADLs by performing:    UE Dressing with Supervision.  LE Dressing with Minimal Assistance.  Grooming while standing with Supervision.  Toileting from bedside commode with Supervision for hygiene and clothing management.   Rolling to Bilateral with [place].   Supine to sit with Stand-by Assistance.  Stand pivot transfers with Stand-by Assistance.    Outcome: Ongoing, Progressing     OT eval completed.

## 2022-01-24 NOTE — PLAN OF CARE
Damion Zheng - Neurosurgery ([facility])  Initial Discharge Assessment       Primary Care Provider: Lamont Venegas MD    Admission Diagnosis: Back pain [M54.9]  COVID [U07.1]    Admission Date: 1/22/2022  Expected Discharge Date: 1/24/2022    SW called pt's room to complete Discharge Planning Assessment via phone due to COVID-19 isolation status.  Per pt's wife Jade, patient lives with his wife and 2 adult sons in a 2-story home with one step(s) to enter.   Per wife, patient was mostly independent with ADLs and used no dme for ambulation prior to admit, but began to need assistance with walking shortly before admit.  Patient will have assistance from his wife and father-in-law upon discharge.  Patient does not attend a coumadin clinic and is not on dialysis.     All questions addressed.  SW ordered a rolling walker and bedside commode for home use and scheduled pt for hospital follow-up with PCP on 1/27.    SW/CM to follow and assist with d/c needs as needed.    Discharge Barriers Identified: None    Payor: [employer] BLUE [employer]IELD / [facility] / Product Type: Commercial /     Extended Emergency Contact Information  Primary Emergency Contact: Jade Graham  Address: [address] [place] [address] [place]  Home Phone: [phone]  Mobile Phone: [phone]  Relation: Spouse    Discharge Plan A: Home with family  Discharge Plan B: Home      [facility] [address]  [address] [address]  Phone: [phone] Fax: [phone]      Initial Assessment (most recent)     Adult Discharge Assessment - 01/24/22 0938        Discharge Assessment    Assessment Type Discharge Planning Assessment     Confirmed/corrected address, phone number and insurance Yes     Confirmed Demographics Correct on Facesheet     Source of Information family     Communicated TOM with patient/caregiver Yes     Reason For  Admission Other spondylosis with radiculopathy, lumbar region     Lives With child(radha), adult;spouse     Do you have help at home or someone to help you manage your care at home? Yes     Who are your caregiver(s) and their phone number(s)? wife Jade and 2 adult sons     Prior to hospitilization cognitive status: Unable to Assess     Current cognitive status: Unable to Assess     Walking or Climbing Stairs Difficulty ambulation difficulty, requires equipment     Mobility Management pt's wife reported that pt was having difficulty walking prior to admit     Home Accessibility stairs within home;stairs to enter home     Number of Stairs, Main Entrance one     Home Layout Able to live on 1st floor     Equipment Currently Used at Home none     Do you currently have service(s) that help you manage your care at home? No     Who is going to help you get home at discharge? wife and father-in-law     How do you get to doctors appointments? family or friend will provide     Are you on dialysis? No     Do you take coumadin? No     Discharge Plan A Home with family     Discharge Plan B Home     DME Needed Upon Discharge  walker, rolling;bedside commode     Discharge Plan discussed with: Spouse/sig other     Name(s) and Number(s) phillip Hansen [phone]     Discharge Barriers Identified None        Relationship/Environment    Name(s) of Who Lives With Patient phillip Hansen and 2 adult sons                      Laura Moreno, DONTA  [id number]

## 2022-01-24 NOTE — ASSESSMENT & PLAN NOTE
- Acute on chronic issue. He reports the back pain began in November and worsened a few weeks ago. He received a steroid injection on Wednesday of last week and reports feeling great after and being pain free for several days. He states that Friday evening as he was walking from his truck he began experiencing 10/10 shooting pain. He describes the the pain as shooting pain to the left side of his lower back radiating down his left leg from his groin to heel. He endorses decreased sensation to his left foot which is unchanged. He also endorses intermittent muscle spasms in his lower back as well. He reports the pain has been so severe that he is unable to walk at this point. Per ED provider NSGY recommends no surgical intervention at this time  - with significant pain, requested NSGY re-evaluate patient again    -Reviewed MRI-Lower lumbar spondylosis with L4-5 left paracentral inferior disc extrusion extending 1.0 cm caudal to the L5 superior endplate resulting in mild spinal canal narrowing and bilateral moderate neural foraminal narrowing.  Extrusion compresses the exiting L4 nerve and closely approximates the descending L5 nerve root which likely contributes to patient's reported symptoms.  -Add scheduled robaxin 1,000mg qid, scheduled toradol 15mg q6hrs, tylenol 1,000mg q8hrs, and lidocaine patch  -Increased gabapentin from 300mg tid to 600mg tid  -PRN oxy IR for breakthrough pain  -PT/OT

## 2022-01-24 NOTE — SUBJECTIVE & OBJECTIVE
Interval History: Overnight he was in significant pain, which worsened this AM after PT OT assessment. He continues to have sharp LBP radiating to his left leg. No urinary retention, saddle anesthesia, significant weakness or numbness noted. Discussed with NSGY, they are planning to revisit him today for further recs.    Review of Systems   Constitutional: Negative for appetite change, chills, fatigue, fever and unexpected weight change.   HENT: Negative for congestion, rhinorrhea, sore throat and trouble swallowing.    Eyes: Negative for photophobia and visual disturbance.   Respiratory: Negative for cough, shortness of breath and wheezing.    Cardiovascular: Negative for chest pain, palpitations and leg swelling.   Gastrointestinal: Negative for abdominal distention, abdominal pain, diarrhea, nausea and vomiting.   Genitourinary: Negative for dysuria, flank pain, frequency and hematuria.   Musculoskeletal: Positive for back pain, gait problem and myalgias. Negative for arthralgias and neck pain.   Skin: Negative for color change and rash.   Neurological: Positive for left leg weakness and radicular pain  Negative for dizziness, speech difficulty, weakness and light-headedness.   Psychiatric/Behavioral: Negative for agitation, confusion and suicidal ideas. The patient is not nervous/anxious.        Objective:     Vital Signs (Most Recent):  Temp: 98 °F (36.7 °C) (01/24/22 1245)  Pulse: 78 (01/24/22 1245)  Resp: 16 (01/24/22 1245)  BP: (!) 142/99 (01/24/22 1245)  SpO2: 99 % (01/24/22 1245) Vital Signs (24h Range):  Temp:  [97.7 °F (36.5 °C)-98.6 °F (37 °C)] 98 °F (36.7 °C)  Pulse:  [] 78  Resp:  [16-22] 16  SpO2:  [92 %-99 %] 99 %  BP: (113-142)/(59-99) 142/99     Weight: 93 kg (205 lb)  Body mass index is 29.41 kg/m².    Intake/Output Summary (Last 24 hours) at 1/24/2022 1503  Last data filed at 1/24/2022 1124  Gross per 24 hour   Intake --   Output 500 ml   Net -500 ml      Physical Exam  Vitals and nursing  note reviewed.   Constitutional:       General: He is in acute distress (mild).      Appearance: He is not toxic-appearing or diaphoretic.   HENT:      Head: Normocephalic and atraumatic.      Nose: Nose normal.      Mouth/Throat:      Mouth: Mucous membranes are moist.   Eyes:      Pupils: Pupils are equal, round, and reactive to light.   Cardiovascular:      Rate and Rhythm: Normal rate and regular rhythm.      Heart sounds: No murmur heard.  Pulmonary:      Effort: No respiratory distress.      Breath sounds: No wheezing, rhonchi or rales.   Abdominal:      General: Bowel sounds are normal. There is no distension.      Palpations: Abdomen is soft.      Tenderness: There is no abdominal tenderness. There is no guarding.   Musculoskeletal:         General: No swelling or deformity.      Cervical back: Normal range of motion. No tenderness.      Thoracic back: No tenderness.      Lumbar back: Point tenderness. Decreased range of motion (2/2 pain).  Comments: Exam limited 2/2 to patient laying on supine due to pain from muscle spasms   Skin:     General: Skin is warm and dry.      Capillary Refill: Capillary refill takes less than 2 seconds.   Neurological:      General: No focal deficit present.      Mental Status: He is alert and oriented to person, place, and time.      Cranial Nerves: No dysarthria or facial asymmetry.      Motor: No weakness or tremor.      Mild sensation deficit on LLE near and below ankle  Psychiatric:         Mood and Affect: Mood normal.         Behavior: Behavior normal.     Significant Labs: All pertinent labs within the past 24 hours have been reviewed.    Significant Imaging: I have reviewed all pertinent imaging results/findings within the past 24 hours.

## 2022-01-24 NOTE — PLAN OF CARE
"Plan of care reviewed with the patient and his significant other. They both verbalized their understanding. The patient was oriented to his room . The patient complained of pain throughout the day and was medicated with his current pain regimen. He states " My pain is slowly getting better" . No s/s of any respiratory distress noted. Patient is on Precautions because of his positive Covid diagnosis. Telemetry monitoring is in place. The bed is locked and in it's lowest position.The call light is within reach. The patient is Alert and Oriented X4. The patient is able to stand but states it hurts when he tries to sit in a chair.Comfort measures are being provided. No s/s of an adverse reaction noted to medication therapy. Will continue to monitor and report any changes.  "

## 2022-01-24 NOTE — PROGRESS NOTES
Patient is Alert and Oriented X 4. The 6MW Test was performed as ordered. The patient was only able to ambulate with assistance for a total of 60 seconds because he is having such severe back pain upon ambulation and any type of movement outside of the bed. The patient is on room air and throughout the test his Oxygen saturation remained greater than 96% on room air. The patient received pain medication prior to the test. Will continue to monitor and report any changes.

## 2022-01-24 NOTE — SUBJECTIVE & OBJECTIVE
Medications Prior to Admission   Medication Sig Dispense Refill Last Dose    amLODIPine (NORVASC) 5 MG tablet Take 1 tablet (5 mg total) by mouth once daily. 30 tablet 5     gabapentin (NEURONTIN) 300 MG capsule Take 1 capsule (300 mg total) by mouth 3 (three) times daily. 30 capsule 1     HYDROcodone-acetaminophen (NORCO) 5-325 mg per tablet Take 1 tablet by mouth every 8 (eight) hours as needed (breakthrough pain). 15 tablet 0     ketorolac (TORADOL) 10 mg tablet Take 1 tablet (10 mg total) by mouth every 6 (six) hours as needed for Pain. Do not take with other NSAIDs such as meloxicam, ibuprofen, or naproxen 12 tablet 0     LIDOcaine (LIDODERM) 5 % Place 1 patch onto the skin once daily. Remove & Discard patch within 12 hours or as directed by MD 30 patch 0     meloxicam (MOBIC) 15 MG tablet Take 1 tablet (15 mg total) by mouth once daily. 30 tablet 1     oxyCODONE-acetaminophen (PERCOCET) 5-325 mg per tablet Take 1 tablet by mouth every 4 (four) hours as needed for Pain. 15 tablet 0     predniSONE (DELTASONE) 20 MG tablet Take 1 tablet (20 mg total) by mouth once daily. 5 tablet 0        Review of patient's allergies indicates:  No Known Allergies    Past Medical History:   Diagnosis Date    Chronic back pain      Past Surgical History:   Procedure Laterality Date    APPENDECTOMY  1996     Family History     Problem Relation (Age of Onset)    Diabetes Father    Heart attack Paternal Grandfather (67)        Tobacco Use    Smoking status: Current Every Day Smoker     Packs/day: 1.00     Types: Cigarettes    Smokeless tobacco: Never Used   Substance and Sexual Activity    Alcohol use: No    Drug use: No    Sexual activity: Yes     Partners: Female     Review of Systems   Constitutional: Negative for activity change and fatigue.   HENT: Negative for postnasal drip and trouble swallowing.    Eyes: Negative for photophobia and visual disturbance.   Respiratory: Negative for shortness of breath.     Cardiovascular: Negative for chest pain.   Gastrointestinal: Negative for nausea and vomiting.   Genitourinary: Negative for decreased urine volume and difficulty urinating.   Musculoskeletal: Positive for back pain. Negative for neck pain and neck stiffness.   Neurological: Positive for weakness (left big toe). Negative for numbness and headaches.   Psychiatric/Behavioral: Negative for confusion.     Objective:     Weight: 93 kg (205 lb)  Body mass index is 29.41 kg/m².  Vital Signs (Most Recent):  Temp: 97.7 °F (36.5 °C) (01/24/22 0830)  Pulse: 80 (01/24/22 0830)  Resp: 18 (01/24/22 0930)  BP: (!) 141/91 (01/24/22 0830)  SpO2: 98 % (01/24/22 0830) Vital Signs (24h Range):  Temp:  [97.7 °F (36.5 °C)-98.6 °F (37 °C)] 97.7 °F (36.5 °C)  Pulse:  [] 80  Resp:  [16-22] 18  SpO2:  [92 %-98 %] 98 %  BP: (113-154)/(59-98) 141/91                          Neurosurgery Physical Exam  General: well developed, well nourished, no distress.   Head: normocephalic, atraumatic  Neurologic: Alert and oriented. Thought content appropriate.  GCS: Motor: 6/Verbal: 5/Eyes: 4 GCS Total: 15  Mental Status: Awake, Alert, Oriented x 4  Language: No aphasia  Speech: No dysarthria  Cranial nerves: face symmetric, tongue midline, CN II-XII grossly intact.   Eyes: pupils equal, round, reactive to light with accommodation, EOMI.   Pulmonary: normal respirations, no signs of respiratory distress  Abdomen: soft, non-distended, not tender to palpation  Skin: Skin is warm, dry and intact.  Sensory: intact to light touch throughout, with L5 sensory dermatomal radiculopathy    Motor Strength:Moves all extremities spontaneously with good tone.  Full strength upper and lower extremities. No abnormal movements seen.     Strength  Deltoids Triceps Biceps Wrist Extension Wrist Flexion Hand    Upper: R 5/5 5/5 5/5 5/5 5/5 5/5    L 5/5 5/5 5/5 5/5 5/5 5/5     Iliopsoas Quadriceps Knee  Flexion Tibialis  anterior Gastro- cnemius EHL   Lower: R 5/5  5/5 5/5 5/5 5/5 5/5    L 5/5 5/5 5/5 5/5 5/5 4/5     Reflexes:   DTR: 2+ symmetrically throughout.  Harris's: Negative.  Babinski's: Negative.  Clonus: Negative.      Significant Labs:  Recent Labs   Lab 01/22/22 2038 01/23/22  0405 01/24/22  0851    198* 98    136 137   K 3.6 4.2 4.4    103 104   CO2 26 22* 26   BUN 16 18 22*   CREATININE 0.8 1.0 1.0   CALCIUM 9.3 9.5 9.5   MG  --  1.8 2.0     Recent Labs   Lab 01/22/22 2038 01/23/22  0404 01/24/22  0851   WBC 9.98 12.09 11.10   HGB 15.4 16.3 16.0   HCT 45.7 47.3 48.2    284 300     Recent Labs   Lab 01/23/22  0405   INR 0.9     Microbiology Results (last 7 days)     ** No results found for the last 168 hours. **        All pertinent labs from the last 24 hours have been reviewed.    Significant Diagnostics:  I have reviewed all pertinent imaging results/findings within the past 24 hours.  I have reviewed and interpreted all pertinent imaging results/findings within the past 24 hours.

## 2022-01-24 NOTE — PT/OT/SLP EVAL
Occupational Therapy   Evaluation    Name: Varun Graham Sr.  MRN: [id number]  Admitting Diagnosis:  Other spondylosis with radiculopathy, lumbar region    Recommendations:     Discharge Recommendations: outpatient PT  Discharge Equipment Recommendations:  bedside commode,walker, rolling  Barriers to discharge:  None    Assessment:     Varun Graham Sr. is a 42 y.o. male with a medical diagnosis of Other spondylosis with radiculopathy, lumbar region.  He presents with decreased independence with ADL's.  Pt with limited ability to tolerate activities on this date due to significant back pain with mobility. Performance deficits affecting function: weakness,impaired endurance,impaired self care skills,impaired functional mobilty,impaired balance,pain,decreased lower extremity function,gait instability.      Rehab Prognosis: Fair; patient would benefit from acute skilled OT services to address these deficits and reach maximum level of function.       Plan:     Patient to be seen 3 x/week to address the above listed problems via self-care/home management,therapeutic activities,therapeutic exercises,neuromuscular re-education  · Plan of Care Expires: 02/23/22  · Plan of Care Reviewed with: patient    Subjective     Chief Complaint: back pain - unable to tolerate sitting  Patient/Family Comments/goals: to have decreased back pain    Occupational Profile:  Living Environment: Pt resides with spouse & 3 children in 2 story house with 1 step to enter house.  Pt's bedroom is on 2nd floor.  Pt has been sleeping on sofa on 1st floor since back injury as unable to tolerate stairs to 2nd floor.  Pt was independent with ADL's, IADL's, & ambulation prior to injury.  Pt is an active  & works as a .  Pt enjoys drag racing.  Equipment Used at Home:  none  Assistance upon Discharge: unknown    Pain/Comfort:  · Pain Rating 1:  (did not rate)  · Location 1: back  · Pain Addressed 1: Pre-medicate for  activity,Reposition,Cessation of Activity,Nurse notified,Distraction  · Pain Rating Post-Intervention 1:  (did not rate)    Patients cultural, spiritual, Yazdanism conflicts given the current situation: no    Objective:     Communicated with: RN prior to session.  Patient found supine with telemetry (no family present) upon OT entry to room.    General Precautions: Standard, airborne,contact,droplet,fall   Orthopedic Precautions:spinal precautions   Braces: N/A    Occupational Performance:    Bed Mobility:    · Patient completed Rolling/Turning to Left with  supervision  · Patient completed Rolling/Turning to Right with supervision  · Patient completed Supine to Sit with pt attempted however unable to perform completely due to back pain (attempted to (B) directions)    Activities of Daily Living:  · Lower Body Dressing: total assistance donning socks while supine    Cognitive/Visual Perceptual:  Cognitive/Psychosocial Skills:     -       Oriented to: Person, Place, Time and Situation   -       Follows Commands/attention:Follows multistep  commands  -       Communication: clear/fluent  -       Safety awareness/insight to disability: intact     Physical Exam:  Sensation:    -       Intact  Dominant hand:    -       left  Upper Extremity Range of Motion:     -       Right Upper Extremity: WFL  -       Left Upper Extremity: WFL  Upper Extremity Strength:    -       Right Upper Extremity: WFL  -       Left Upper Extremity: WFL   Strength:    -       Right Upper Extremity: WFL  -       Left Upper Extremity: WFL    AMPAC 6 Click ADL:  AMPAC Total Score: 15    Treatment & Education:  Provided education on spinal precautions during activities as well as for general activities as tolerated outside of therapy session.  Discussed at length positioning for comfort (pt able to tolerate right sidelying position with LLE in flexed position).  Provided education regarding role of OT, POC, & discharge recommendations with pt  verbalizing understanding.  Pt had no further questions & when asked whether there were any concerns pt reported none.      Education:    Patient left right sidelying with all lines intact, call button in reach and RN notified    GOALS:   Multidisciplinary Problems     Occupational Therapy Goals        Problem: Occupational Therapy Goal    Goal Priority Disciplines Outcome Interventions   Occupational Therapy Goal     OT, PT/OT Ongoing, Progressing    Description: Goals to be met by: 2/4     Patient will increase functional independence with ADLs by performing:    UE Dressing with Supervision.  LE Dressing with Minimal Assistance.  Grooming while standing with Supervision.  Toileting from bedside commode with Supervision for hygiene and clothing management.   Rolling to Bilateral with [place].   Supine to sit with Stand-by Assistance.  Stand pivot transfers with Stand-by Assistance.                     History:     Past Medical History:   Diagnosis Date    Chronic back pain        Past Surgical History:   Procedure Laterality Date    APPENDECTOMY  1996       Time Tracking:     OT Date of Treatment: 01/24/22  OT Start Time: 1026  OT Stop Time: 1045  OT Total Time (min): 19 min    Billable Minutes:Evaluation 10  Therapeutic Activity 9    1/24/2022

## 2022-01-24 NOTE — ASSESSMENT & PLAN NOTE
-Patient initially hypertensive, pain is likely contributing as well as not taking home antihypertensive.  -Was previously placed on amlodipine but reports he stopped taking it because he had headaches, agreeable to trying different medication.  -continue lisinopril 5mg daily  - pain mgmt

## 2022-01-24 NOTE — PROGRESS NOTES
[facility] - Neurosurgery ([facility])  Neurosurgery  Progress Note    Subjective:     History of Present Illness: Pt is 42M with chronic low back pain who complains of one day worsening acute low back pain after turning body awkwardly while getting into car. Two months ago patient was oving table with sudden increase in back pain and has had complaints of mild left big toe weakness and pain down left leg. MRI L spine shows L4-L5 mild spondylosis with herniation of disc paracentrally causing moderate neurforaminal stenosis L>R. NSGY consulted for evaluation.      Post-Op Info:  * No surgery found *         Interval History: patient was doing 6 min walk test for home O2 when he experienced sudden excruciating left hip and leg pain. Pain previously controlled prior to this and he was ready to discharge. Patient now crying in pain in bed on exam. Pain located in left hip and radiating down the posterolateral left leg down into the ankle and big toe. No numbness or tingling. Denies saddle anesthesia. Report he feels his left foot is weaker than it was. Patient has been voiding spontaneously.     Medications:  Continuous Infusions:  Scheduled Meds:   acetaminophen  1,000 mg Oral Q8H    ascorbic acid (vitamin C)  500 mg Oral BID    baclofen  5 mg Oral TID    enoxaparin  40 mg Subcutaneous Q24H    gabapentin  600 mg Oral TID    LIDOcaine  3 patch Transdermal Q24H    lisinopriL  5 mg Oral Daily    methocarbamoL  1,000 mg Oral QID    methylPREDNISolone  4 mg Oral Daily    multivitamin  1 tablet Oral Daily    senna-docusate 8.6-50 mg  1 tablet Oral Daily     PRN Meds:acetaminophen, dextrose 50%, dextrose 50%, glucagon (human recombinant), glucose, glucose, melatonin, morphine, oxyCODONE, polyethylene glycol, sodium chloride 0.9%, sodium chloride 0.9%     Review of Systems  Objective:     Weight: 93 kg (205 lb)  Body mass index is 29.41 kg/m².  Vital Signs (Most Recent):  Temp: 98 °F (36.7 °C) (01/24/22 1245)  Pulse: 78  (01/24/22 1245)  Resp: 16 (01/24/22 1245)  BP: (!) 142/99 (01/24/22 1245)  SpO2: 99 % (01/24/22 1245) Vital Signs (24h Range):  Temp:  [97.7 °F (36.5 °C)-98.6 °F (37 °C)] 98 °F (36.7 °C)  Pulse:  [] 78  Resp:  [16-22] 16  SpO2:  [92 %-99 %] 99 %  BP: (113-142)/(59-99) 142/99     Date 01/24/22 0700 - 01/25/22 0659   Shift 7356-8284 0525-8947 9423-6649 24 Hour Total   INTAKE   Shift Total(mL/kg)       OUTPUT   Urine(mL/kg/hr) 500(0.7)   500   Shift Total(mL/kg) 500(5.4)   500(5.4)   Weight (kg) 93 93 93 93           Neurosurgery Physical Exam  General: well developed, well nourished, no distress.   Head: normocephalic, atraumatic  Neurologic: Alert and oriented. Thought content appropriate.  GCS: Motor: 6/Verbal: 5/Eyes: 4 GCS Total: 15  Mental Status: Awake, Alert, Oriented x 4  Language: No aphasia  Speech: No dysarthria  Cranial nerves: face symmetric, tongue midline, CN II-XII grossly intact.   Eyes: pupils equal, round, reactive to light with accommodation, EOMI.   Pulmonary: normal respirations, no signs of respiratory distress  Abdomen: soft, non-distended, not tender to palpation  Skin: Skin is warm, dry and intact.  Sensory: intact to light touch throughout  Motor Strength:    Strength  Deltoids Triceps Biceps Wrist Extension Wrist Flexion Hand    Upper: R 5/5 5/5 5/5 5/5 5/5 5/5    L 5/5 5/5 5/5 5/5 5/5 5/5     Iliopsoas Quadriceps Knee  Flexion Tibialis  anterior Gastro- cnemius EHL   Lower: R 4/5 5/5 5/5 5/5 5/5 5/5    L 4/5 5/5 5/5 5/5 5/5 3/5             Significant Labs:  Recent Labs   Lab 01/22/22 2038 01/23/22  0405 01/24/22  0851    198* 98    136 137   K 3.6 4.2 4.4    103 104   CO2 26 22* 26   BUN 16 18 22*   CREATININE 0.8 1.0 1.0   CALCIUM 9.3 9.5 9.5   MG  --  1.8 2.0     Recent Labs   Lab 01/22/22  2038 01/23/22  0404 01/24/22  0851   WBC 9.98 12.09 11.10   HGB 15.4 16.3 16.0   HCT 45.7 47.3 48.2    284 300     Recent Labs   Lab 01/23/22  0405   INR 0.9      Microbiology Results (last 7 days)     ** No results found for the last 168 hours. **        All pertinent labs from the last 24 hours have been reviewed.    Significant Diagnostics:  I independently reviewed the imaging.       Assessment/Plan:     * Other spondylosis with radiculopathy, lumbar region  Pt is 42M with chronic low back pain who complains of one day worsening acute low back pain after turning body awkwardly while getting into car. Two months ago patient was oving table with sudden increase in back pain and has had complaints of mild left big toe weakness and pain down left leg. MRI L spine shows L4-L5 mild spondylosis with herniation of disc paracentrally causing moderate neurforaminal stenosis L>R. NSGY consulted for evaluation.    Plan:  Admitted to  floor status  No emergent surgical intervention indicated  Recommend decadron 10 mg IV once with continued pain control  Imaging reviewed: MRI L spine shows L4-L5 mild spondylosis with herniation of disc paracentrally causing moderate neurforaminal stenosis L>R  Will discuss with Dr. Irena santiago inpatient vs outpatient management           Gita Michaels PA-C  Neurosurgery  [facility] - Neurosurgery ([facility])

## 2022-01-24 NOTE — HOSPITAL COURSE
42 y.o. male with a PMHx of HTN and chronic lower back pain who transferred from [facility] ED for MRI lumbar spine and neurosurgery evaluation. MRI lumbar spine with lower lumbar spondylosis with L4-5 left paracentral inferior disc extrusion extending 1.0 cm caudal to the L5 superior endplate resulting in mild spinal canal narrowing and bilateral moderate neural foraminal narrowing (as per read). Extrusion compresses the exiting L4 nerve and closely approximates the descending L5 nerve root. NSGY consulted, no acute interventions. Pain mgmt recommended. He has had no relief despite several scheduled muscle relaxants, pain management and narcotics. Dicussed LSO brace with NSGY, but they recommend against this as this may worsen his condition. Acute pain consulted as he continues to have 10/10 pain, they added few other multimodal pain meds but no change in pain. NSGY and Acute pain recs neuro IR for JENNIFER which can improve his symptoms then hopefully outpatient surgery. His pain fortunately improved. Plan for dc home today with NSGY referral.     He is not vaccinated and refused vaccination.    He was asked to isolate for total 10 days. Asymptomatic incidental COVID.

## 2022-01-25 ENCOUNTER — ANESTHESIA (OUTPATIENT)
Dept: NEUROSURGERY | Facility: HOSPITAL | Age: 42
DRG: 551 | End: 2022-01-25
Payer: COMMERCIAL

## 2022-01-25 ENCOUNTER — ANESTHESIA EVENT (OUTPATIENT)
Dept: NEUROSURGERY | Facility: HOSPITAL | Age: 42
DRG: 551 | End: 2022-01-25
Payer: COMMERCIAL

## 2022-01-25 LAB
ANION GAP SERPL CALC-SCNC: 11 MMOL/L (ref 8–16)
BASOPHILS # BLD AUTO: 0.03 K/UL (ref 0–0.2)
BASOPHILS NFR BLD: 0.2 % (ref 0–1.9)
BUN SERPL-MCNC: 22 MG/DL (ref 6–20)
CALCIUM SERPL-MCNC: 9.6 MG/DL (ref 8.7–10.5)
CHLORIDE SERPL-SCNC: 104 MMOL/L (ref 95–110)
CO2 SERPL-SCNC: 22 MMOL/L (ref 23–29)
CREAT SERPL-MCNC: 0.8 MG/DL (ref 0.5–1.4)
DIFFERENTIAL METHOD: ABNORMAL
EOSINOPHIL # BLD AUTO: 0 K/UL (ref 0–0.5)
EOSINOPHIL NFR BLD: 0 % (ref 0–8)
ERYTHROCYTE [DISTWIDTH] IN BLOOD BY AUTOMATED COUNT: 12 % (ref 11.5–14.5)
EST. GFR  (AFRICAN AMERICAN): >60 ML/MIN/1.73 M^2
EST. GFR  (NON AFRICAN AMERICAN): >60 ML/MIN/1.73 M^2
FERRITIN SERPL-MCNC: 396 NG/ML (ref 20–300)
GLUCOSE SERPL-MCNC: 155 MG/DL (ref 70–110)
HCT VFR BLD AUTO: 48.8 % (ref 40–54)
HGB BLD-MCNC: 16.4 G/DL (ref 14–18)
IMM GRANULOCYTES # BLD AUTO: 0.12 K/UL (ref 0–0.04)
IMM GRANULOCYTES NFR BLD AUTO: 0.9 % (ref 0–0.5)
LDH SERPL L TO P-CCNC: 235 U/L (ref 110–260)
LYMPHOCYTES # BLD AUTO: 1.7 K/UL (ref 1–4.8)
LYMPHOCYTES NFR BLD: 12.9 % (ref 18–48)
MAGNESIUM SERPL-MCNC: 2 MG/DL (ref 1.6–2.6)
MCH RBC QN AUTO: 28 PG (ref 27–31)
MCHC RBC AUTO-ENTMCNC: 33.6 G/DL (ref 32–36)
MCV RBC AUTO: 83 FL (ref 82–98)
MONOCYTES # BLD AUTO: 0.6 K/UL (ref 0.3–1)
MONOCYTES NFR BLD: 5 % (ref 4–15)
NEUTROPHILS # BLD AUTO: 10.4 K/UL (ref 1.8–7.7)
NEUTROPHILS NFR BLD: 81 % (ref 38–73)
NRBC BLD-RTO: 0 /100 WBC
PLATELET # BLD AUTO: 298 K/UL (ref 150–450)
PMV BLD AUTO: 9.6 FL (ref 9.2–12.9)
POTASSIUM SERPL-SCNC: 4.6 MMOL/L (ref 3.5–5.1)
RBC # BLD AUTO: 5.86 M/UL (ref 4.6–6.2)
SARS-COV-2 RNA RESP QL NAA+PROBE: DETECTED
SODIUM SERPL-SCNC: 137 MMOL/L (ref 136–145)
WBC # BLD AUTO: 12.86 K/UL (ref 3.9–12.7)

## 2022-01-25 PROCEDURE — 27000207 HC ISOLATION

## 2022-01-25 PROCEDURE — 25000003 PHARM REV CODE 250: Performed by: STUDENT IN AN ORGANIZED HEALTH CARE EDUCATION/TRAINING PROGRAM

## 2022-01-25 PROCEDURE — U0005 INFEC AGEN DETEC AMPLI PROBE: HCPCS | Performed by: INTERNAL MEDICINE

## 2022-01-25 PROCEDURE — 85025 COMPLETE CBC W/AUTO DIFF WBC: CPT | Performed by: NURSE PRACTITIONER

## 2022-01-25 PROCEDURE — 83735 ASSAY OF MAGNESIUM: CPT | Performed by: NURSE PRACTITIONER

## 2022-01-25 PROCEDURE — 36415 COLL VENOUS BLD VENIPUNCTURE: CPT | Performed by: NURSE PRACTITIONER

## 2022-01-25 PROCEDURE — 99233 SBSQ HOSP IP/OBS HIGH 50: CPT | Mod: ,,, | Performed by: INTERNAL MEDICINE

## 2022-01-25 PROCEDURE — 63600175 PHARM REV CODE 636 W HCPCS: Performed by: INTERNAL MEDICINE

## 2022-01-25 PROCEDURE — 25000003 PHARM REV CODE 250: Performed by: INTERNAL MEDICINE

## 2022-01-25 PROCEDURE — 83615 LACTATE (LD) (LDH) ENZYME: CPT | Performed by: NURSE PRACTITIONER

## 2022-01-25 PROCEDURE — 82728 ASSAY OF FERRITIN: CPT | Performed by: NURSE PRACTITIONER

## 2022-01-25 PROCEDURE — 96376 TX/PRO/DX INJ SAME DRUG ADON: CPT

## 2022-01-25 PROCEDURE — U0003 INFECTIOUS AGENT DETECTION BY NUCLEIC ACID (DNA OR RNA); SEVERE ACUTE RESPIRATORY SYNDROME CORONAVIRUS 2 (SARS-COV-2) (CORONAVIRUS DISEASE [COVID-19]), AMPLIFIED PROBE TECHNIQUE, MAKING USE OF HIGH THROUGHPUT TECHNOLOGIES AS DESCRIBED BY CMS-2020-01-R: HCPCS | Performed by: INTERNAL MEDICINE

## 2022-01-25 PROCEDURE — 80048 BASIC METABOLIC PNL TOTAL CA: CPT | Performed by: NURSE PRACTITIONER

## 2022-01-25 PROCEDURE — 11000001 HC ACUTE MED/SURG PRIVATE ROOM

## 2022-01-25 PROCEDURE — 99233 PR SUBSEQUENT HOSPITAL CARE,LEVL III: ICD-10-PCS | Mod: ,,, | Performed by: INTERNAL MEDICINE

## 2022-01-25 PROCEDURE — 99231 SBSQ HOSP IP/OBS SF/LOW 25: CPT | Mod: ,,, | Performed by: ANESTHESIOLOGY

## 2022-01-25 PROCEDURE — 63600175 PHARM REV CODE 636 W HCPCS: Performed by: NURSE PRACTITIONER

## 2022-01-25 PROCEDURE — 99231 PR SUBSEQUENT HOSPITAL CARE,LEVL I: ICD-10-PCS | Mod: ,,, | Performed by: ANESTHESIOLOGY

## 2022-01-25 PROCEDURE — 25000003 PHARM REV CODE 250: Performed by: NURSE PRACTITIONER

## 2022-01-25 PROCEDURE — 63600175 PHARM REV CODE 636 W HCPCS: Performed by: PHYSICIAN ASSISTANT

## 2022-01-25 RX ORDER — AMITRIPTYLINE HYDROCHLORIDE 50 MG/1
50 TABLET, FILM COATED ORAL NIGHTLY
Status: DISCONTINUED | OUTPATIENT
Start: 2022-01-25 | End: 2022-01-27 | Stop reason: HOSPADM

## 2022-01-25 RX ORDER — DIAZEPAM 5 MG/1
5 TABLET ORAL NIGHTLY
Status: COMPLETED | OUTPATIENT
Start: 2022-01-25 | End: 2022-01-25

## 2022-01-25 RX ORDER — OXYCODONE HYDROCHLORIDE 10 MG/1
10 TABLET ORAL EVERY 6 HOURS PRN
Status: DISCONTINUED | OUTPATIENT
Start: 2022-01-25 | End: 2022-01-27 | Stop reason: HOSPADM

## 2022-01-25 RX ORDER — GABAPENTIN 300 MG/1
900 CAPSULE ORAL 3 TIMES DAILY
Status: DISCONTINUED | OUTPATIENT
Start: 2022-01-25 | End: 2022-01-27 | Stop reason: HOSPADM

## 2022-01-25 RX ORDER — DIAZEPAM 5 MG/1
5 TABLET ORAL ONCE
Status: COMPLETED | OUTPATIENT
Start: 2022-01-25 | End: 2022-01-25

## 2022-01-25 RX ORDER — DEXAMETHASONE 4 MG/1
4 TABLET ORAL EVERY 8 HOURS
Status: DISCONTINUED | OUTPATIENT
Start: 2022-01-25 | End: 2022-01-27 | Stop reason: HOSPADM

## 2022-01-25 RX ORDER — CELECOXIB 200 MG/1
200 CAPSULE ORAL DAILY
Status: DISCONTINUED | OUTPATIENT
Start: 2022-01-25 | End: 2022-01-27 | Stop reason: HOSPADM

## 2022-01-25 RX ORDER — MORPHINE SULFATE 2 MG/ML
3 INJECTION, SOLUTION INTRAMUSCULAR; INTRAVENOUS
Status: DISCONTINUED | OUTPATIENT
Start: 2022-01-25 | End: 2022-01-26

## 2022-01-25 RX ADMIN — DEXAMETHASONE 4 MG: 4 TABLET ORAL at 09:01

## 2022-01-25 RX ADMIN — MORPHINE SULFATE 3 MG: 2 INJECTION, SOLUTION INTRAMUSCULAR; INTRAVENOUS at 09:01

## 2022-01-25 RX ADMIN — DOCUSATE SODIUM 50 MG AND SENNOSIDES 8.6 MG 1 TABLET: 8.6; 5 TABLET, FILM COATED ORAL at 09:01

## 2022-01-25 RX ADMIN — METHOCARBAMOL 1000 MG: 500 TABLET ORAL at 09:01

## 2022-01-25 RX ADMIN — ENOXAPARIN SODIUM 40 MG: 100 INJECTION SUBCUTANEOUS at 06:01

## 2022-01-25 RX ADMIN — ACETAMINOPHEN 1000 MG: 500 TABLET ORAL at 10:01

## 2022-01-25 RX ADMIN — GABAPENTIN 900 MG: 300 CAPSULE ORAL at 03:01

## 2022-01-25 RX ADMIN — BACLOFEN 5 MG: 5 TABLET ORAL at 09:01

## 2022-01-25 RX ADMIN — OXYCODONE HYDROCHLORIDE 10 MG: 10 TABLET ORAL at 12:01

## 2022-01-25 RX ADMIN — OXYCODONE HYDROCHLORIDE 10 MG: 10 TABLET ORAL at 10:01

## 2022-01-25 RX ADMIN — GABAPENTIN 600 MG: 300 CAPSULE ORAL at 09:01

## 2022-01-25 RX ADMIN — METHOCARBAMOL 1000 MG: 500 TABLET ORAL at 12:01

## 2022-01-25 RX ADMIN — DIAZEPAM 5 MG: 5 TABLET ORAL at 09:01

## 2022-01-25 RX ADMIN — MORPHINE SULFATE 3 MG: 2 INJECTION, SOLUTION INTRAMUSCULAR; INTRAVENOUS at 01:01

## 2022-01-25 RX ADMIN — LISINOPRIL 5 MG: 5 TABLET ORAL at 09:01

## 2022-01-25 RX ADMIN — DEXAMETHASONE 4 MG: 4 TABLET ORAL at 03:01

## 2022-01-25 RX ADMIN — THERA TABS 1 TABLET: TAB at 09:01

## 2022-01-25 RX ADMIN — BACLOFEN 5 MG: 5 TABLET ORAL at 03:01

## 2022-01-25 RX ADMIN — DEXAMETHASONE 4 MG: 4 TABLET ORAL at 10:01

## 2022-01-25 RX ADMIN — ACETAMINOPHEN 1000 MG: 500 TABLET ORAL at 05:01

## 2022-01-25 RX ADMIN — DIAZEPAM 5 MG: 5 TABLET ORAL at 03:01

## 2022-01-25 RX ADMIN — POLYETHYLENE GLYCOL 3350 17 G: 17 POWDER, FOR SOLUTION ORAL at 12:01

## 2022-01-25 RX ADMIN — ACETAMINOPHEN 1000 MG: 500 TABLET ORAL at 03:01

## 2022-01-25 RX ADMIN — METHOCARBAMOL 1000 MG: 500 TABLET ORAL at 06:01

## 2022-01-25 RX ADMIN — OXYCODONE HYDROCHLORIDE AND ACETAMINOPHEN 500 MG: 500 TABLET ORAL at 09:01

## 2022-01-25 RX ADMIN — GABAPENTIN 900 MG: 300 CAPSULE ORAL at 10:01

## 2022-01-25 RX ADMIN — AMITRIPTYLINE HYDROCHLORIDE 50 MG: 50 TABLET, FILM COATED ORAL at 09:01

## 2022-01-25 RX ADMIN — OXYCODONE HYDROCHLORIDE 10 MG: 10 TABLET ORAL at 05:01

## 2022-01-25 RX ADMIN — CELECOXIB 200 MG: 200 CAPSULE ORAL at 03:01

## 2022-01-25 RX ADMIN — LIDOCAINE 5% 3 PATCH: 700 PATCH TOPICAL at 12:01

## 2022-01-25 NOTE — CONSULTS
Consult received and patient evaluated. Please see Consult note from today.      Ko Hickey MD  Department of Anesthesiology  PGY-2/CA-1  Pager: [phone]

## 2022-01-25 NOTE — PROGRESS NOTES
[facility] Zheng - Neurosurgery ([facility] Medicine  Progress Note    Patient Name: Varun Graham Sr.  MRN: [id number]  Patient Class: IP- Inpatient   Admission Date: 1/22/2022  Length of Stay: 0 days  Attending Physician: Rubin Frausto MD  Primary Care Provider: Lamont Venegas MD        Subjective:     Principal Problem:Other spondylosis with radiculopathy, lumbar region        HPI:  Varun Graham Sr. Is a  42 y.o. male with a PMHx of HTN and chronic lower back pain who transferred from [facility] for MRI lumbar spine and neurosurgery evaluation. The patient reports lower back pain beginning in November that worsened a few weeks ago. He reports being seen by [facility] and PT and MRI spine were recommended at that time. He was unable to get insurance approval for MRI due to not completing physical therapy prior. He received a steroid injection on Wednesday of last week and reports feeling great after and being pain free for several days. He states that Friday evening as he was walking from his truck he began experiencing 10/10 shooting pain. He describes the the pain as shooting pain to the left side of his lower back radiating down his left leg from his groin to heel. He endorses decreased sensation to his left foot which is unchanged. He also endorses intermittent muscle spasms in his lower back as well. He reports the pain has been so severe that he is unable to walk at this point. He states certain positions make the pain worse and the steroid injection was the only thing that provided relief. He has tried gabapentin, robaxin, zanaflex, percocet and prednisone with limited benefit.The patient denies any bowel or bladder incontinence or saddle anesthesia. The patient denies any fever, chills, shortness of breath, cough, chest pain, abdominal pain, dizziness, lightheadedness, or dysuria.    In the ED, patient hypertensive and mildly tachypneic in the ED, pain likely contributing.  Otherwise VSSAF. CBC and CMP unremarkable. CXR with no acute process. COVID-19 positive. MRI lumbar spine with lower lumbar spondylosis with L4-5 left paracentral inferior disc extrusion extending 1.0 cm caudal to the L5 superior endplate resulting in mild spinal canal narrowing and bilateral moderate neural foraminal narrowing.  Extrusion compresses the exiting L4 nerve and closely approximates the descending L5 nerve root which likely contributes to patient's reported symptoms. NSGY initially accepted the patient for transfer but reviewed imaging and recommended no surgical intervention and observation under [facility] medicine for pain control and PT/OT.      Overview/Hospital Course:  42 y.o. male with a PMHx of HTN and chronic lower back pain who transferred from [facility] for MRI lumbar spine and neurosurgery evaluation. MRI lumbar spine with lower lumbar spondylosis with L4-5 left paracentral inferior disc extrusion extending 1.0 cm caudal to the L5 superior endplate resulting in mild spinal canal narrowing and bilateral moderate neural foraminal narrowing. Extrusion compresses the exiting L4 nerve and closely approximates the descending L5 nerve root. NSGY consulted, no acute interventions. Pain mgmt recommended. He has had no relief with several schedule muscle relaxants and narcotics. Dicussed LSO brace with NSGY, but they recommend against this as this may worsen his condition. Acute pain consulted as he continues to have 10/10 pain.       Interval History: He continued to have significant pain overnight and this AM. No relief with scheduled and PRN meds. NSGY with no surgical intervention. He feels as no surgeon is listening to his concern. I continues to reassure him. Neuro exam stable. He continues to have intermittent sharp LBP radiating to his left leg. No urinary retention, saddle anesthesia, significant weakness or numbness noted. Discussed with acute pain, they will see patient.    Review of  Systems   Constitutional: Negative for appetite change, chills, fatigue, fever and unexpected weight change.   HENT: Negative for congestion, rhinorrhea, sore throat and trouble swallowing.    Eyes: Negative for photophobia and visual disturbance.   Respiratory: Negative for cough, shortness of breath and wheezing.    Cardiovascular: Negative for chest pain, palpitations and leg swelling.   Gastrointestinal: Negative for abdominal distention, abdominal pain, diarrhea, nausea and vomiting.   Genitourinary: Negative for dysuria, flank pain, frequency and hematuria.   Musculoskeletal: Positive for back pain, gait problem and myalgias. Negative for arthralgias and neck pain.   Skin: Negative for color change and rash.   Neurological: Positive for left leg weakness and radicular pain  Negative for dizziness, speech difficulty, weakness and light-headedness.   Psychiatric/Behavioral: Negative for agitation, confusion and suicidal ideas. The patient is not nervous/anxious.        Objective:     Vital Signs (Most Recent):  Temp: 98.2 °F (36.8 °C) (01/25/22 1214)  Pulse: 80 (01/25/22 1214)  Resp: 18 (01/25/22 1221)  BP: (!) 164/92 (01/25/22 1214)  SpO2: 98 % (01/25/22 1214) Vital Signs (24h Range):  Temp:  [96.7 °F (35.9 °C)-98.5 °F (36.9 °C)] 98.2 °F (36.8 °C)  Pulse:  [73-99] 80  Resp:  [14-20] 18  SpO2:  [92 %-98 %] 98 %  BP: (115-164)/(77-97) 164/92     Weight: 93 kg (205 lb)  Body mass index is 29.41 kg/m².    Intake/Output Summary (Last 24 hours) at 1/25/2022 1343  Last data filed at 1/25/2022 1221  Gross per 24 hour   Intake --   Output 1750 ml   Net -1750 ml      Physical Exam  Vitals and nursing note reviewed.   Constitutional:       General: He is in acute distress (mild).      Appearance: He is not toxic-appearing or diaphoretic.   HENT:      Head: Normocephalic and atraumatic.      Nose: Nose normal.      Mouth/Throat:      Mouth: Mucous membranes are moist.   Eyes:      Pupils: Pupils are equal, round, and  reactive to light.   Cardiovascular:      Rate and Rhythm: Normal rate and regular rhythm.      Heart sounds: No murmur heard.  Pulmonary:      Effort: No respiratory distress.      Breath sounds: No wheezing, rhonchi or rales.   Abdominal:      General: Bowel sounds are normal. There is no distension.      Palpations: Abdomen is soft.      Tenderness: There is no abdominal tenderness. There is no guarding.   Musculoskeletal:         General: No swelling or deformity.      Cervical back: Normal range of motion. No tenderness.      Thoracic back: No tenderness.      Lumbar back: Point tenderness. Decreased range of motion (2/2 pain).  Comments: Exam limited 2/2 to patient laying on supine due to pain from muscle spasms   Skin:     General: Skin is warm and dry.      Capillary Refill: Capillary refill takes less than 2 seconds.   Neurological:      General: No focal deficit present.      Mental Status: He is alert and oriented to person, place, and time.      Cranial Nerves: No dysarthria or facial asymmetry.      Motor: No weakness or tremor.      Mild sensation deficit on LLE near and below ankle  Psychiatric:    Emotionally labile due to pain and current issues      Significant Labs: All pertinent labs within the past 24 hours have been reviewed.     Significant Imaging: I have reviewed all pertinent imaging results/findings within the past 24 hours.        Assessment/Plan:      * Other spondylosis with radiculopathy, lumbar region  - Acute on chronic issue. He reports the back pain began in November and worsened a few weeks ago. He received a steroid injection on Wednesday of last week and reports feeling great after and being pain free for several days. He states that Friday evening as he was walking from his truck he began experiencing 10/10 shooting pain. He describes the the pain as shooting pain to the left side of his lower back radiating down his left leg from his groin to heel. He endorses decreased  sensation to his left foot which is unchanged. He also endorses intermittent muscle spasms in his lower back as well. He reports the pain has been so severe that he is unable to walk at this point. Per ED provider NSGY recommends no surgical intervention at this time  -Reviewed MRI-Lower lumbar spondylosis with L4-5 left paracentral inferior disc extrusion extending 1.0 cm caudal to the L5 superior endplate resulting in mild spinal canal narrowing and bilateral moderate neural foraminal narrowing.  Extrusion compresses the exiting L4 nerve and closely approximates the descending L5 nerve root which likely contributes to patient's reported symptoms.  - with significant pain, requested NSGY re-evaluate patient again, still no surgincal intervention noted.  -continue scheduled robaxin 1000mg qid, scheduled toradol 15mg q6hrs, tylenol 1000mg q8hrs, and lidocaine patch x 3  - Added baclofen  -Increased gabapentin to 900mg tid  -Acute pain consulted as he continues to have 10/10 pain  -PRN oxy IR for breakthrough pain  -PT/OT    HTN (hypertension)  -Patient initially hypertensive, pain is likely contributing as well as not taking home antihypertensive.  -Was previously placed on amlodipine but reports he stopped taking it because he had headaches, agreeable to trying different medication.  -continue lisinopril 5mg daily  - pain mgmt     COVID-19 virus infection  Patient found to be COVID-19 positive on routine screen prior to transfer. Asymptomatic and incidental. CXR negative for acute process. Patient does not meet criteria for treatment with decadron or remdesivir at this time.  - COVID-19 testing 1/22/2022  - Infection Control notified   - Isolation:   - Airborne, Contact and Droplet Precautions  - Cohort patients into COVID units  - N95 mask, wear eye protection  - 20 second hand hygiene              - Limit visitors per hospital policy              - Consolidating lab draws, nursing care, provider visits, and  interventions    - Diagnostics: (leukopenia, hyponatremia, hyperferritinemia, elevated troponin, elevated d-dimer, age, and comorbidities are significant predictors of poor clinical outcome)  CBC, CMP, Ferritin, CRP, LDH, BNP, Troponin, Portable CXR and UA and culture    - Management:  Supplemental O2 to maintain SpO2 >92%  Telemetry  Continuous/intermittent Pulse Ox    VTE Risk Mitigation (From admission, onward)         Ordered     enoxaparin injection 40 mg  Every 24 hours (non-standard times)         01/23/22 0310     IP VTE HIGH RISK PATIENT  Once         01/23/22 0601     Place sequential compression device  Until discontinued         01/23/22 0601                Discharge Planning   TOM: 1/27/2022     Code Status: Full Code   Is the patient medically ready for discharge?: No    Reason for patient still in hospital (select all that apply): Patient trending condition  Discharge Plan A: Home with family          Rubin Frausto MD  Department of Hospital Medicine   [facility] - Neurosurgery ([facility])

## 2022-01-25 NOTE — SUBJECTIVE & OBJECTIVE
Interval History: He continued to have significant pain overnight and this AM. No relief with scheduled and PRN meds. NSGY with no surgical intervention. He feels as no surgeon is listening to his concern. I continues to reassure him. Neuro exam stable. He continues to have intermittent sharp LBP radiating to his left leg. No urinary retention, saddle anesthesia, significant weakness or numbness noted. Discussed with acute pain, they will see patient.    Review of Systems   Constitutional: Negative for appetite change, chills, fatigue, fever and unexpected weight change.   HENT: Negative for congestion, rhinorrhea, sore throat and trouble swallowing.    Eyes: Negative for photophobia and visual disturbance.   Respiratory: Negative for cough, shortness of breath and wheezing.    Cardiovascular: Negative for chest pain, palpitations and leg swelling.   Gastrointestinal: Negative for abdominal distention, abdominal pain, diarrhea, nausea and vomiting.   Genitourinary: Negative for dysuria, flank pain, frequency and hematuria.   Musculoskeletal: Positive for back pain, gait problem and myalgias. Negative for arthralgias and neck pain.   Skin: Negative for color change and rash.   Neurological: Positive for left leg weakness and radicular pain  Negative for dizziness, speech difficulty, weakness and light-headedness.   Psychiatric/Behavioral: Negative for agitation, confusion and suicidal ideas. The patient is not nervous/anxious.        Objective:     Vital Signs (Most Recent):  Temp: 98.2 °F (36.8 °C) (01/25/22 1214)  Pulse: 80 (01/25/22 1214)  Resp: 18 (01/25/22 1221)  BP: (!) 164/92 (01/25/22 1214)  SpO2: 98 % (01/25/22 1214) Vital Signs (24h Range):  Temp:  [96.7 °F (35.9 °C)-98.5 °F (36.9 °C)] 98.2 °F (36.8 °C)  Pulse:  [73-99] 80  Resp:  [14-20] 18  SpO2:  [92 %-98 %] 98 %  BP: (115-164)/(77-97) 164/92     Weight: 93 kg (205 lb)  Body mass index is 29.41 kg/m².    Intake/Output Summary (Last 24 hours) at 1/25/2022  1343  Last data filed at 1/25/2022 1221  Gross per 24 hour   Intake --   Output 1750 ml   Net -1750 ml      Physical Exam  Vitals and nursing note reviewed.   Constitutional:       General: He is in acute distress (mild).      Appearance: He is not toxic-appearing or diaphoretic.   HENT:      Head: Normocephalic and atraumatic.      Nose: Nose normal.      Mouth/Throat:      Mouth: Mucous membranes are moist.   Eyes:      Pupils: Pupils are equal, round, and reactive to light.   Cardiovascular:      Rate and Rhythm: Normal rate and regular rhythm.      Heart sounds: No murmur heard.  Pulmonary:      Effort: No respiratory distress.      Breath sounds: No wheezing, rhonchi or rales.   Abdominal:      General: Bowel sounds are normal. There is no distension.      Palpations: Abdomen is soft.      Tenderness: There is no abdominal tenderness. There is no guarding.   Musculoskeletal:         General: No swelling or deformity.      Cervical back: Normal range of motion. No tenderness.      Thoracic back: No tenderness.      Lumbar back: Point tenderness. Decreased range of motion (2/2 pain).  Comments: Exam limited 2/2 to patient laying on supine due to pain from muscle spasms   Skin:     General: Skin is warm and dry.      Capillary Refill: Capillary refill takes less than 2 seconds.   Neurological:      General: No focal deficit present.      Mental Status: He is alert and oriented to person, place, and time.      Cranial Nerves: No dysarthria or facial asymmetry.      Motor: No weakness or tremor.      Mild sensation deficit on LLE near and below ankle  Psychiatric:    Emotionally labile due to pain and current issues      Significant Labs: All pertinent labs within the past 24 hours have been reviewed.     Significant Imaging: I have reviewed all pertinent imaging results/findings within the past 24 hours.

## 2022-01-25 NOTE — PT/OT/SLP PROGRESS
Physical Therapy      Patient Name:  Varun Graham .   MRN:  [id number]    Patient not seen today secondary to Pain (Attempted in AM but RN advised PT to hold 2* increased pain with movement. Pt only with pain control when lying flat.). PT unable to re-attempt in PM. Will follow-up at next scheduled session as able.    1/25/2022

## 2022-01-25 NOTE — ANESTHESIA POST-OP PAIN MANAGEMENT
Acute Pain Consult Note    Varun Graham Sr. is a 41 yo male with a PMHx of HTN and chronic lower back pain who transferred from [facility] ED for MRI lumbar spine and neurosurgery evaluation. He's been having LBP since November that worsened a couple of weeks ago. MRI L-spine done showing lower lumbar spondylosis with L4-5 left paracentral inferior disc extrusion. NSGY consulted but no surgical intervention warranted at this time. Acute Pain Service consulted for pain management while inpatient.     When seen at bedside, patient was in moderate amount of pain, attempting to find a comfortable position in bed. He states that any sort of movement worsens his pain but it is relieved by lying still. The pain spreads across his lower back and radiates down his left leg. Patient understands that his chronic LBP will not improve in the long term. He has recently seen a chronic pain doctor who gave him a steroid injection. Patient felt significantly better with that but the pain worsened when he was attempting to get out of his car. He plans to go see his chronic pain doctor when he is discharged. Disposition pending pain control at this time.     Problem List:    Active Hospital Problems    Diagnosis  POA    *Other spondylosis with radiculopathy, lumbar region [M47.26]  Yes    COVID-19 virus infection [U07.1]  Yes    HTN (hypertension) [I10]  Yes      Resolved Hospital Problems   No resolved problems to display.           Objective:      Vitals   Vitals:    01/25/22 1221   BP:    Pulse:    Resp: 18   Temp:      Constitutional:       General: He is in acute distress   HENT:      Head: Normocephalic and atraumatic.   Pulmonary:      Effort: No respiratory distress.   Musculoskeletal:         Lumbar back: Decreased range of motion (2/2 pain). Unable to perform proper exam due to patient's severe pain     Neurological:      General: No focal deficit present.      Mental Status: He is alert and oriented to person, place,  and time.      Labs    Admission on 01/22/2022   Component Date Value Ref Range Status    WBC 01/22/2022 9.98  3.90 - 12.70 K/uL Final    RBC 01/22/2022 5.47  4.60 - 6.20 M/uL Final    Hemoglobin 01/22/2022 15.4  14.0 - 18.0 g/dL Final    Hematocrit 01/22/2022 45.7  40.0 - 54.0 % Final    MCV 01/22/2022 84  82 - 98 fL Final    MCH 01/22/2022 28.2  27.0 - 31.0 pg Final    MCHC 01/22/2022 33.7  32.0 - 36.0 g/dL Final    RDW 01/22/2022 11.9  11.5 - 14.5 % Final    Platelets 01/22/2022 253  150 - 450 K/uL Final    MPV 01/22/2022 9.2  9.2 - 12.9 fL Final    Immature Granulocytes 01/22/2022 0.4  0.0 - 0.5 % Final    Gran # (ANC) 01/22/2022 7.5  1.8 - 7.7 K/uL Final    Immature Grans (Abs) 01/22/2022 0.04  0.00 - 0.04 K/uL Final    Lymph # 01/22/2022 1.5  1.0 - 4.8 K/uL Final    Mono # 01/22/2022 0.8  0.3 - 1.0 K/uL Final    Eos # 01/22/2022 0.0  0.0 - 0.5 K/uL Final    Baso # 01/22/2022 0.02  0.00 - 0.20 K/uL Final    nRBC 01/22/2022 0  0 /100 WBC Final    Gran % 01/22/2022 75.6* 38.0 - 73.0 % Final    Lymph % 01/22/2022 15.3* 18.0 - 48.0 % Final    Mono % 01/22/2022 8.3  4.0 - 15.0 % Final    Eosinophil % 01/22/2022 0.2  0.0 - 8.0 % Final    Basophil % 01/22/2022 0.2  0.0 - 1.9 % Final    Differential Method 01/22/2022 Automated   Final    Sodium 01/22/2022 137  136 - 145 mmol/L Final    Potassium 01/22/2022 3.6  3.5 - 5.1 mmol/L Final    Chloride 01/22/2022 103  95 - 110 mmol/L Final    CO2 01/22/2022 26  23 - 29 mmol/L Final    Glucose 01/22/2022 101  70 - 110 mg/dL Final    BUN 01/22/2022 16  6 - 20 mg/dL Final    Creatinine 01/22/2022 0.8  0.5 - 1.4 mg/dL Final    Calcium 01/22/2022 9.3  8.7 - 10.5 mg/dL Final    Total Protein 01/22/2022 7.2  6.0 - 8.4 g/dL Final    Albumin 01/22/2022 4.1  3.5 - 5.2 g/dL Final    Total Bilirubin 01/22/2022 0.6  0.1 - 1.0 mg/dL Final    Alkaline Phosphatase 01/22/2022 60  55 - 135 U/L Final    AST 01/22/2022 20  10 - 40 U/L Final    ALT 01/22/2022  40  10 - 44 U/L Final    Anion Gap 01/22/2022 8  8 - 16 mmol/L Final    eGFR if African American 01/22/2022 >60.0  >60 mL/min/1.73 m^2 Final    eGFR if non African American 01/22/2022 >60.0  >60 mL/min/1.73 m^2 Final    Sed Rate 01/23/2022 20  0 - 23 mm/Hr Final    LD 01/23/2022 234  110 - 260 U/L Final    Ferritin 01/23/2022 562* 20.0 - 300.0 ng/mL Final    Troponin I 01/23/2022 <0.006  0.000 - 0.026 ng/mL Final    CPK 01/23/2022 71  20 - 200 U/L Final    WBC 01/23/2022 12.09  3.90 - 12.70 K/uL Final    RBC 01/23/2022 5.70  4.60 - 6.20 M/uL Final    Hemoglobin 01/23/2022 16.3  14.0 - 18.0 g/dL Final    Hematocrit 01/23/2022 47.3  40.0 - 54.0 % Final    MCV 01/23/2022 83  82 - 98 fL Final    MCH 01/23/2022 28.6  27.0 - 31.0 pg Final    MCHC 01/23/2022 34.5  32.0 - 36.0 g/dL Final    RDW 01/23/2022 11.9  11.5 - 14.5 % Final    Platelets 01/23/2022 284  150 - 450 K/uL Final    MPV 01/23/2022 9.7  9.2 - 12.9 fL Final    Immature Granulocytes 01/23/2022 0.4  0.0 - 0.5 % Final    Gran # (ANC) 01/23/2022 11.0* 1.8 - 7.7 K/uL Final    Immature Grans (Abs) 01/23/2022 0.05* 0.00 - 0.04 K/uL Final    Lymph # 01/23/2022 0.9* 1.0 - 4.8 K/uL Final    Mono # 01/23/2022 0.2* 0.3 - 1.0 K/uL Final    Eos # 01/23/2022 0.0  0.0 - 0.5 K/uL Final    Baso # 01/23/2022 0.02  0.00 - 0.20 K/uL Final    nRBC 01/23/2022 0  0 /100 WBC Final    Gran % 01/23/2022 90.7* 38.0 - 73.0 % Final    Lymph % 01/23/2022 7.4* 18.0 - 48.0 % Final    Mono % 01/23/2022 1.3* 4.0 - 15.0 % Final    Eosinophil % 01/23/2022 0.0  0.0 - 8.0 % Final    Basophil % 01/23/2022 0.2  0.0 - 1.9 % Final    Differential Method 01/23/2022 Automated   Final    BNP 01/23/2022 43  0 - 99 pg/mL Final    Prothrombin Time 01/23/2022 10.3  9.0 - 12.5 sec Final    INR 01/23/2022 0.9  0.8 - 1.2 Final    Lactate (Lactic Acid) 01/23/2022 2.6* 0.5 - 2.2 mmol/L Final    Specimen UA 01/23/2022 Urine, Clean Catch   Final    Color, UA 01/23/2022 Yellow   Yellow, Straw, Bev Final    Appearance, UA 01/23/2022 Hazy* Clear Final    pH, UA 01/23/2022 7.0  5.0 - 8.0 Final    Specific Gravity, UA 01/23/2022 1.015  1.005 - 1.030 Final    Protein, UA 01/23/2022 Negative  Negative Final    Glucose, UA 01/23/2022 Negative  Negative Final    Ketones, UA 01/23/2022 Negative  Negative Final    Bilirubin (UA) 01/23/2022 Negative  Negative Final    Occult Blood UA 01/23/2022 Negative  Negative Final    Nitrite, UA 01/23/2022 Negative  Negative Final    Leukocytes, UA 01/23/2022 Negative  Negative Final    Sodium 01/23/2022 136  136 - 145 mmol/L Final    Potassium 01/23/2022 4.2  3.5 - 5.1 mmol/L Final    Chloride 01/23/2022 103  95 - 110 mmol/L Final    CO2 01/23/2022 22* 23 - 29 mmol/L Final    Glucose 01/23/2022 198* 70 - 110 mg/dL Final    BUN 01/23/2022 18  6 - 20 mg/dL Final    Creatinine 01/23/2022 1.0  0.5 - 1.4 mg/dL Final    Calcium 01/23/2022 9.5  8.7 - 10.5 mg/dL Final    Anion Gap 01/23/2022 11  8 - 16 mmol/L Final    eGFR if African American 01/23/2022 >60.0  >60 mL/min/1.73 m^2 Final    eGFR if non African American 01/23/2022 >60.0  >60 mL/min/1.73 m^2 Final    Magnesium 01/23/2022 1.8  1.6 - 2.6 mg/dL Final    POCT Glucose 01/24/2022 132* 70 - 110 mg/dL Final    Lactate (Lactic Acid) 01/24/2022 1.0  0.5 - 2.2 mmol/L Final    WBC 01/24/2022 11.10  3.90 - 12.70 K/uL Final    RBC 01/24/2022 5.72  4.60 - 6.20 M/uL Final    Hemoglobin 01/24/2022 16.0  14.0 - 18.0 g/dL Final    Hematocrit 01/24/2022 48.2  40.0 - 54.0 % Final    MCV 01/24/2022 84  82 - 98 fL Final    MCH 01/24/2022 28.0  27.0 - 31.0 pg Final    MCHC 01/24/2022 33.2  32.0 - 36.0 g/dL Final    RDW 01/24/2022 12.3  11.5 - 14.5 % Final    Platelets 01/24/2022 300  150 - 450 K/uL Final    MPV 01/24/2022 9.3  9.2 - 12.9 fL Final    Immature Granulocytes 01/24/2022 0.5  0.0 - 0.5 % Final    Gran # (ANC) 01/24/2022 7.4  1.8 - 7.7 K/uL Final    Immature Grans (Abs) 01/24/2022  0.06* 0.00 - 0.04 K/uL Final    Lymph # 01/24/2022 2.7  1.0 - 4.8 K/uL Final    Mono # 01/24/2022 1.0  0.3 - 1.0 K/uL Final    Eos # 01/24/2022 0.0  0.0 - 0.5 K/uL Final    Baso # 01/24/2022 0.04  0.00 - 0.20 K/uL Final    nRBC 01/24/2022 0  0 /100 WBC Final    Gran % 01/24/2022 66.2  38.0 - 73.0 % Final    Lymph % 01/24/2022 23.9  18.0 - 48.0 % Final    Mono % 01/24/2022 8.9  4.0 - 15.0 % Final    Eosinophil % 01/24/2022 0.1  0.0 - 8.0 % Final    Basophil % 01/24/2022 0.4  0.0 - 1.9 % Final    Differential Method 01/24/2022 Automated   Final    Sodium 01/24/2022 137  136 - 145 mmol/L Final    Potassium 01/24/2022 4.4  3.5 - 5.1 mmol/L Final    Chloride 01/24/2022 104  95 - 110 mmol/L Final    CO2 01/24/2022 26  23 - 29 mmol/L Final    Glucose 01/24/2022 98  70 - 110 mg/dL Final    BUN 01/24/2022 22* 6 - 20 mg/dL Final    Creatinine 01/24/2022 1.0  0.5 - 1.4 mg/dL Final    Calcium 01/24/2022 9.5  8.7 - 10.5 mg/dL Final    Total Protein 01/24/2022 7.1  6.0 - 8.4 g/dL Final    Albumin 01/24/2022 3.7  3.5 - 5.2 g/dL Final    Total Bilirubin 01/24/2022 0.3  0.1 - 1.0 mg/dL Final    Alkaline Phosphatase 01/24/2022 53* 55 - 135 U/L Final    AST 01/24/2022 12  10 - 40 U/L Final    ALT 01/24/2022 23  10 - 44 U/L Final    Anion Gap 01/24/2022 7* 8 - 16 mmol/L Final    eGFR if African American 01/24/2022 >60.0  >60 mL/min/1.73 m^2 Final    eGFR if non African American 01/24/2022 >60.0  >60 mL/min/1.73 m^2 Final    Magnesium 01/24/2022 2.0  1.6 - 2.6 mg/dL Final    Phosphorus 01/24/2022 3.7  2.7 - 4.5 mg/dL Final    LD 01/25/2022 235  110 - 260 U/L Final    Ferritin 01/25/2022 396* 20.0 - 300.0 ng/mL Final    WBC 01/25/2022 12.86* 3.90 - 12.70 K/uL Final    RBC 01/25/2022 5.86  4.60 - 6.20 M/uL Final    Hemoglobin 01/25/2022 16.4  14.0 - 18.0 g/dL Final    Hematocrit 01/25/2022 48.8  40.0 - 54.0 % Final    MCV 01/25/2022 83  82 - 98 fL Final    MCH 01/25/2022 28.0  27.0 - 31.0 pg Final     MCHC 01/25/2022 33.6  32.0 - 36.0 g/dL Final    RDW 01/25/2022 12.0  11.5 - 14.5 % Final    Platelets 01/25/2022 298  150 - 450 K/uL Final    MPV 01/25/2022 9.6  9.2 - 12.9 fL Final    Immature Granulocytes 01/25/2022 0.9* 0.0 - 0.5 % Final    Gran # (ANC) 01/25/2022 10.4* 1.8 - 7.7 K/uL Final    Immature Grans (Abs) 01/25/2022 0.12* 0.00 - 0.04 K/uL Final    Lymph # 01/25/2022 1.7  1.0 - 4.8 K/uL Final    Mono # 01/25/2022 0.6  0.3 - 1.0 K/uL Final    Eos # 01/25/2022 0.0  0.0 - 0.5 K/uL Final    Baso # 01/25/2022 0.03  0.00 - 0.20 K/uL Final    nRBC 01/25/2022 0  0 /100 WBC Final    Gran % 01/25/2022 81.0* 38.0 - 73.0 % Final    Lymph % 01/25/2022 12.9* 18.0 - 48.0 % Final    Mono % 01/25/2022 5.0  4.0 - 15.0 % Final    Eosinophil % 01/25/2022 0.0  0.0 - 8.0 % Final    Basophil % 01/25/2022 0.2  0.0 - 1.9 % Final    Differential Method 01/25/2022 Automated   Final    Sodium 01/25/2022 137  136 - 145 mmol/L Final    Potassium 01/25/2022 4.6  3.5 - 5.1 mmol/L Final    Chloride 01/25/2022 104  95 - 110 mmol/L Final    CO2 01/25/2022 22* 23 - 29 mmol/L Final    Glucose 01/25/2022 155* 70 - 110 mg/dL Final    BUN 01/25/2022 22* 6 - 20 mg/dL Final    Creatinine 01/25/2022 0.8  0.5 - 1.4 mg/dL Final    Calcium 01/25/2022 9.6  8.7 - 10.5 mg/dL Final    Anion Gap 01/25/2022 11  8 - 16 mmol/L Final    eGFR if African American 01/25/2022 >60.0  >60 mL/min/1.73 m^2 Final    eGFR if non African American 01/25/2022 >60.0  >60 mL/min/1.73 m^2 Final    Magnesium 01/25/2022 2.0  1.6 - 2.6 mg/dL Final        Meds   Current Facility-Administered Medications   Medication Dose Route Frequency Provider Last Rate Last Admin    acetaminophen tablet 1,000 mg  1,000 mg Oral Q8H Meg Murrell NP   1,000 mg at 01/25/22 0545    acetaminophen tablet 650 mg  650 mg Oral Q8H PRN Meg Murrell NP        ascorbic acid (vitamin C) tablet 500 mg  500 mg Oral BID Meg Murrell NP   500 mg at 01/25/22  0904    baclofen tablet 5 mg  5 mg Oral TID Rubin Frautso MD   5 mg at 01/25/22 0905    dexAMETHasone tablet 4 mg  4 mg Oral Q8H Gita Michaels PA-C   4 mg at 01/25/22 0904    dextrose 50% injection 12.5 g  12.5 g Intravenous PRN Meg Murrell NP        dextrose 50% injection 25 g  25 g Intravenous PRN Meg Murrell NP        enoxaparin injection 40 mg  40 mg Subcutaneous Q24H Meg Murrell NP   40 mg at 01/24/22 1754    gabapentin capsule 900 mg  900 mg Oral TID Rubin Frausto MD        glucagon (human recombinant) injection 1 mg  1 mg Intramuscular PRN Meg Murrell NP        glucose chewable tablet 16 g  16 g Oral PRN Meg Murrell NP        glucose chewable tablet 24 g  24 g Oral PRN Meg Murrell NP        LIDOcaine 5 % patch 3 patch  3 patch Transdermal Q24H Rubin Frausto MD   3 patch at 01/25/22 1222    lisinopriL tablet 5 mg  5 mg Oral Daily Meg Murrell NP   5 mg at 01/25/22 0904    melatonin tablet 6 mg  6 mg Oral Nightly PRN Meg Murrell NP   6 mg at 01/23/22 2051    methocarbamoL tablet 1,000 mg  1,000 mg Oral QID Meg Murrell NP   1,000 mg at 01/25/22 1220    morphine injection 3 mg  3 mg Intravenous Q3H PRN Rubin Frausto MD   3 mg at 01/25/22 0906    multivitamin tablet  1 tablet Oral Daily Meg Murrell NP   1 tablet at 01/25/22 0905    oxyCODONE immediate release tablet Tab 10 mg  10 mg Oral Q6H PRN Rubin Frausto MD   10 mg at 01/25/22 1221    polyethylene glycol packet 17 g  17 g Oral TID PRN Rubin Frausto MD   17 g at 01/25/22 1221    senna-docusate 8.6-50 mg per tablet 1 tablet  1 tablet Oral Daily Rubin Frausto MD   1 tablet at 01/25/22 0905    sodium chloride 0.9% flush 10 mL  10 mL Intravenous PRN Violette Pérez MD        sodium chloride 0.9% flush 10 mL  10 mL Intravenous PRN Meg Murrell NP                Assessment:     Pain control inadequate. No surgical intervention warranted by NSGY at  this time.    Prior to consultation, patient was on acetaminophen 1g q8h, baclofen 5mg TID, dexamethasone 4mg q8h PO, gabapentin 900mg TID, lidocaine patch x 3, robaxin 1000mg QID with PRN oxycodone 10mg q6h PRN and morphine 3mg q3h PRN      Plan:  -- continue acetaminophen, robaxin, baclofen, and dexamethasone.  -- started on celebrex 200mg QD and amitriptyline 50mg QHS.   -- valium 5mg PO x 1 now and 5mg x 1 tonight prior to bed.    -- patient with realistic goals and understands that his pain will not be controlled chronically. Patient would like to have some functionality in the short-term so he can be discharged and go see his Interventional Pain physician for long-term management  -- refrain from giving patient IV morphine unless his pain is not managed with PO medications. Order changed to reflect this. Patient understands that the short-term goal is to stop taking IV pain medication while inpatient.   -- will re-evaluate in morning       Thank you for involving us in the care of Mr. Varun Graham. We will continue to follow. Please do not make any changes to patient's pain medications. The Acute Pain Service will manage medications. Call [phone] with any questions/concerns.        Ko Hickey MD  Department of Anesthesiology  PGY-2/CA-1  Pager: [phone]

## 2022-01-25 NOTE — ASSESSMENT & PLAN NOTE
- Acute on chronic issue. He reports the back pain began in November and worsened a few weeks ago. He received a steroid injection on Wednesday of last week and reports feeling great after and being pain free for several days. He states that Friday evening as he was walking from his truck he began experiencing 10/10 shooting pain. He describes the the pain as shooting pain to the left side of his lower back radiating down his left leg from his groin to heel. He endorses decreased sensation to his left foot which is unchanged. He also endorses intermittent muscle spasms in his lower back as well. He reports the pain has been so severe that he is unable to walk at this point. Per ED provider NSGY recommends no surgical intervention at this time  -Reviewed MRI-Lower lumbar spondylosis with L4-5 left paracentral inferior disc extrusion extending 1.0 cm caudal to the L5 superior endplate resulting in mild spinal canal narrowing and bilateral moderate neural foraminal narrowing.  Extrusion compresses the exiting L4 nerve and closely approximates the descending L5 nerve root which likely contributes to patient's reported symptoms.  - with significant pain, requested NSGY re-evaluate patient again, still no surgincal intervention noted.  -continue scheduled robaxin 1000mg qid, scheduled toradol 15mg q6hrs, tylenol 1000mg q8hrs, and lidocaine patch x 3  - Added baclofen  -Increased gabapentin to 900mg tid  -Acute pain consulted as he continues to have 10/10 pain  -PRN oxy IR for breakthrough pain  -PT/OT

## 2022-01-25 NOTE — PLAN OF CARE
"Plan of care reviewed with the patient and his wife. They both verbalized their understanding.The patient's pain intensified when he got out of the bed to attempt to walk today. The patient was in tears because of the unrelieved pain to his back. Comfort measures were provided and the patient was medicated with his current pain regimen. The patient states " I am good as long as I lay in the bed, but my pain starts all over again when I attempt to walk or get out of the bed. No s/s of any respiratory distress noted. The patient is on precautions because of his positive Covid diagnosis. Telemetry monitoring is in place. The bed is locked and in it's lowest position.The call light is within reach. The patient is Alert and Oriented X4. No s/s of an adverse reaction noted to medication therapy. Will continue to monitor and report any changes  "

## 2022-01-25 NOTE — PLAN OF CARE
Problem: Adult Inpatient Plan of Care  Goal: Plan of Care Review  Outcome: Ongoing, Progressing  Flowsheets (Taken 1/25/2022 0357)  Plan of Care Reviewed With:   patient   spouse     Problem: Fall Injury Risk  Goal: Absence of Fall and Fall-Related Injury  Outcome: Ongoing, Progressing  Intervention: Promote Injury-Free Environment  Flowsheets (Taken 1/25/2022 0358)  Safety Promotion/Fall Prevention:   Fall Risk reviewed with patient/family   Fall Risk signage in place   lighting adjusted   medications reviewed   supervised activity   side rails raised x 3   instructed to call staff for mobility     Problem: Pain Acute  Goal: Acceptable Pain Control and Functional Ability  Outcome: Ongoing, Progressing  Intervention: Prevent or Manage Pain  Flowsheets (Taken 1/25/2022 0358)  Sleep/Rest Enhancement:   noise level reduced   family presence promoted  Sensory Stimulation Regulation:   quiet environment promoted   care clustered   lighting decreased  Medication Review/Management: medications reviewed   POC reviewed with pt and spouse at the bedside.  Both verbalized understanding of POC.  Concerns addressed.  No neuro changes. C/o back pain, medicated for pain this shift.  Remains afebrile, see VS in the flowsheet.  Isolation precautions maintained.  Hygiene promoted.  Wife at the bedside.  Safety maintained.  Instructed to call for assistance.  Bed low and call light within reach.

## 2022-01-25 NOTE — PLAN OF CARE
"Plan of care reviewed with the patient and his wife.They both verbalized their understanding.The patient's pain intensifies when he attempts to get out of the bed to walk or sit in a chair.The patient is in tears because of the unrelieved pain to his back. Comfort measures are being provided and the patient is being medicated with his current pain regimen. The patient states " I am good as long as I lay in the bed, but my pain starts all over again when I attempt to walk or get out of the bed.  The patient is laying in the bed with the HOB flat at the present time. No s/s of any respiratory distress noted. The patient is on Isolation and Contact precautions because of his positive Covid diagnosis. The bed is locked and in it's lowest position.The call light is within reach. The patient is Alert and Oriented X4. No s/s of an adverse reaction noted to medication therapy. Will continue to monitor and report any changes      "

## 2022-01-26 PROCEDURE — 99233 PR SUBSEQUENT HOSPITAL CARE,LEVL III: ICD-10-PCS | Mod: ,,, | Performed by: INTERNAL MEDICINE

## 2022-01-26 PROCEDURE — 97530 THERAPEUTIC ACTIVITIES: CPT

## 2022-01-26 PROCEDURE — 63600175 PHARM REV CODE 636 W HCPCS: Performed by: NURSE PRACTITIONER

## 2022-01-26 PROCEDURE — 99223 1ST HOSP IP/OBS HIGH 75: CPT | Mod: ,,,

## 2022-01-26 PROCEDURE — 99231 PR SUBSEQUENT HOSPITAL CARE,LEVL I: ICD-10-PCS | Mod: ,,, | Performed by: ANESTHESIOLOGY

## 2022-01-26 PROCEDURE — 27000207 HC ISOLATION

## 2022-01-26 PROCEDURE — 63600175 PHARM REV CODE 636 W HCPCS: Performed by: STUDENT IN AN ORGANIZED HEALTH CARE EDUCATION/TRAINING PROGRAM

## 2022-01-26 PROCEDURE — 25000003 PHARM REV CODE 250: Performed by: STUDENT IN AN ORGANIZED HEALTH CARE EDUCATION/TRAINING PROGRAM

## 2022-01-26 PROCEDURE — 99231 SBSQ HOSP IP/OBS SF/LOW 25: CPT | Mod: ,,, | Performed by: ANESTHESIOLOGY

## 2022-01-26 PROCEDURE — 25000003 PHARM REV CODE 250: Performed by: INTERNAL MEDICINE

## 2022-01-26 PROCEDURE — 25000003 PHARM REV CODE 250: Performed by: NURSE PRACTITIONER

## 2022-01-26 PROCEDURE — 99233 SBSQ HOSP IP/OBS HIGH 50: CPT | Mod: ,,, | Performed by: INTERNAL MEDICINE

## 2022-01-26 PROCEDURE — 94760 N-INVAS EAR/PLS OXIMETRY 1: CPT

## 2022-01-26 PROCEDURE — 99223 PR INITIAL HOSPITAL CARE,LEVL III: ICD-10-PCS | Mod: ,,,

## 2022-01-26 PROCEDURE — 63600175 PHARM REV CODE 636 W HCPCS: Performed by: PHYSICIAN ASSISTANT

## 2022-01-26 PROCEDURE — 11000001 HC ACUTE MED/SURG PRIVATE ROOM

## 2022-01-26 RX ORDER — PANTOPRAZOLE SODIUM 40 MG/1
40 TABLET, DELAYED RELEASE ORAL DAILY
Status: DISCONTINUED | OUTPATIENT
Start: 2022-01-26 | End: 2022-01-27 | Stop reason: HOSPADM

## 2022-01-26 RX ADMIN — BACLOFEN 5 MG: 5 TABLET ORAL at 03:01

## 2022-01-26 RX ADMIN — ACETAMINOPHEN 1000 MG: 500 TABLET ORAL at 03:01

## 2022-01-26 RX ADMIN — PANTOPRAZOLE SODIUM 40 MG: 40 TABLET, DELAYED RELEASE ORAL at 12:01

## 2022-01-26 RX ADMIN — AMITRIPTYLINE HYDROCHLORIDE 50 MG: 50 TABLET, FILM COATED ORAL at 08:01

## 2022-01-26 RX ADMIN — BACLOFEN 5 MG: 5 TABLET ORAL at 08:01

## 2022-01-26 RX ADMIN — OXYCODONE HYDROCHLORIDE 10 MG: 10 TABLET ORAL at 09:01

## 2022-01-26 RX ADMIN — DEXAMETHASONE 4 MG: 4 TABLET ORAL at 05:01

## 2022-01-26 RX ADMIN — OXYCODONE HYDROCHLORIDE 10 MG: 10 TABLET ORAL at 03:01

## 2022-01-26 RX ADMIN — ACETAMINOPHEN 1000 MG: 500 TABLET ORAL at 10:01

## 2022-01-26 RX ADMIN — METHOCARBAMOL 1000 MG: 500 TABLET ORAL at 08:01

## 2022-01-26 RX ADMIN — DOCUSATE SODIUM 50 MG AND SENNOSIDES 8.6 MG 1 TABLET: 8.6; 5 TABLET, FILM COATED ORAL at 09:01

## 2022-01-26 RX ADMIN — DEXAMETHASONE 4 MG: 4 TABLET ORAL at 10:01

## 2022-01-26 RX ADMIN — OXYCODONE HYDROCHLORIDE AND ACETAMINOPHEN 500 MG: 500 TABLET ORAL at 09:01

## 2022-01-26 RX ADMIN — ENOXAPARIN SODIUM 40 MG: 100 INJECTION SUBCUTANEOUS at 05:01

## 2022-01-26 RX ADMIN — MORPHINE SULFATE 3 MG: 2 INJECTION, SOLUTION INTRAMUSCULAR; INTRAVENOUS at 04:01

## 2022-01-26 RX ADMIN — BACLOFEN 5 MG: 5 TABLET ORAL at 09:01

## 2022-01-26 RX ADMIN — CELECOXIB 200 MG: 200 CAPSULE ORAL at 09:01

## 2022-01-26 RX ADMIN — DEXAMETHASONE 4 MG: 4 TABLET ORAL at 03:01

## 2022-01-26 RX ADMIN — METHOCARBAMOL 1000 MG: 500 TABLET ORAL at 05:01

## 2022-01-26 RX ADMIN — POLYETHYLENE GLYCOL 3350 17 G: 17 POWDER, FOR SOLUTION ORAL at 09:01

## 2022-01-26 RX ADMIN — THERA TABS 1 TABLET: TAB at 09:01

## 2022-01-26 RX ADMIN — GABAPENTIN 900 MG: 300 CAPSULE ORAL at 08:01

## 2022-01-26 RX ADMIN — OXYCODONE HYDROCHLORIDE AND ACETAMINOPHEN 500 MG: 500 TABLET ORAL at 08:01

## 2022-01-26 RX ADMIN — OXYCODONE HYDROCHLORIDE 10 MG: 10 TABLET ORAL at 10:01

## 2022-01-26 RX ADMIN — GABAPENTIN 900 MG: 300 CAPSULE ORAL at 09:01

## 2022-01-26 RX ADMIN — METHOCARBAMOL 1000 MG: 500 TABLET ORAL at 09:01

## 2022-01-26 RX ADMIN — GABAPENTIN 900 MG: 300 CAPSULE ORAL at 03:01

## 2022-01-26 RX ADMIN — LISINOPRIL 5 MG: 5 TABLET ORAL at 09:01

## 2022-01-26 RX ADMIN — METHOCARBAMOL 1000 MG: 500 TABLET ORAL at 12:01

## 2022-01-26 RX ADMIN — LIDOCAINE 5% 3 PATCH: 700 PATCH TOPICAL at 12:01

## 2022-01-26 NOTE — PLAN OF CARE
Problem: Adult Inpatient Plan of Care  Goal: Plan of Care Review  Outcome: Ongoing, Progressing  Flowsheets (Taken 1/26/2022 0519)  Plan of Care Reviewed With:   patient   spouse     Problem: Fall Injury Risk  Goal: Absence of Fall and Fall-Related Injury  Outcome: Ongoing, Progressing  Intervention: Promote Injury-Free Environment  Flowsheets (Taken 1/26/2022 0519)  Safety Promotion/Fall Prevention:   Fall Risk reviewed with patient/family   Fall Risk signage in place   lighting adjusted   medications reviewed   instructed to call staff for mobility   nonskid shoes/socks when out of bed   POC reviewed with pt and spouse at the bedside.  Both verbalized understanding of POC.  Pain managed by prn pain meds.  No neuro changes, no falls, no c/o NV.  Resp unlabored.  Even.  Afebrile.  Continues on isolation, isolation precautions maintained.  Instructed to call staff for assistance.  Bed locked.  Call light within reach.

## 2022-01-26 NOTE — ASSESSMENT & PLAN NOTE
Pt is 42M with chronic low back pain who complains of one day worsening acute low back pain after turning body awkwardly while getting into car. Two months ago patient was oving table with sudden increase in back pain and has had complaints of mild left big toe weakness and pain down left leg. MRI L spine shows L4-L5 mild spondylosis with herniation of disc paracentrally with caudal migration causing moderate neurforaminal stenosis L>R.     NSGY reconsulted to evaluate for significant, continued pain.    Plan:  -Admitted to  floor status.  -Acute pain management following for pain control. Recommend decadron 10 mg IV once with continued pain control.  -Imaging reviewed: MRI L spine shows L4-L5 mild spondylosis with herniation of disc paracentrally with caudal migration causing moderate neurforaminal stenosis L>R.  -A lengthy discussion was held with the patient and wife at bedside. Patient with improved pain. He is neurologically stable and without deficits. No emergent neurosurgical intervention is indicated at this time. The patient and wife voiced understanding and have agreed with the plan to follow up with neurosurgery in clinic to discuss treatment options.  -Recommend left L4-L5 JENNIFER upon discharge with established provider, given patient has had relief of pain in the past with JENNIFER.

## 2022-01-26 NOTE — CARE UPDATE
Discussed with patient for potential plan for IR to do JENNIFER while inpatient for pain control but will be up to IR's discretion. Will sign off at this time. Thank you for involving us in the care of Mr. Varun Graham.      Ko Hickey MD  Department of Anesthesiology  PGY-2/CA-1  Pager: [phone]

## 2022-01-26 NOTE — CONSULTS
[facility]heng - Neurosurgery ([facility])  Neurosurgery  Consult Note    Inpatient consult to Neurosurgery  Consult performed by: Becca Oconnor PA-C  Consult ordered by: Meg Murrell NP        Subjective:     Chief Complaint/Reason for Admission: Low back pain and radiculopathy    History of Present Illness: Pt is 42M with chronic low back pain who complains of one day worsening acute low back pain after turning body awkwardly while getting into car. Two months ago patient was moving table with sudden increase in back pain and has had complaints of mild left big toe weakness and pain down left leg. MRI L spine shows L4-L5 mild spondylosis with herniation of disc paracentrally with caudal migration causing moderate neurforaminal stenosis L>R. NSGY reconsulted for evaluation given patient's significant pain.      Medications Prior to Admission   Medication Sig Dispense Refill Last Dose    amLODIPine (NORVASC) 5 MG tablet Take 1 tablet (5 mg total) by mouth once daily. 30 tablet 5     gabapentin (NEURONTIN) 300 MG capsule Take 1 capsule (300 mg total) by mouth 3 (three) times daily. 30 capsule 1     HYDROcodone-acetaminophen (NORCO) 5-325 mg per tablet Take 1 tablet by mouth every 8 (eight) hours as needed (breakthrough pain). 15 tablet 0     ketorolac (TORADOL) 10 mg tablet Take 1 tablet (10 mg total) by mouth every 6 (six) hours as needed for Pain. Do not take with other NSAIDs such as meloxicam, ibuprofen, or naproxen 12 tablet 0     LIDOcaine (LIDODERM) 5 % Place 1 patch onto the skin once daily. Remove & Discard patch within 12 hours or as directed by MD 30 patch 0     meloxicam (MOBIC) 15 MG tablet Take 1 tablet (15 mg total) by mouth once daily. 30 tablet 1     oxyCODONE-acetaminophen (PERCOCET) 5-325 mg per tablet Take 1 tablet by mouth every 4 (four) hours as needed for Pain. 15 tablet 0     predniSONE (DELTASONE) 20 MG tablet Take 1 tablet (20 mg total) by mouth once daily. 5 tablet 0        Review  of patient's allergies indicates:  No Known Allergies    Past Medical History:   Diagnosis Date    Chronic back pain      Past Surgical History:   Procedure Laterality Date    APPENDECTOMY  1996     Family History     Problem Relation (Age of Onset)    Diabetes Father    Heart attack Paternal Grandfather (67)        Tobacco Use    Smoking status: Current Every Day Smoker     Packs/day: 1.00     Types: Cigarettes    Smokeless tobacco: Never Used   Substance and Sexual Activity    Alcohol use: No    Drug use: No    Sexual activity: Yes     Partners: Female     Review of Systems   Constitutional: Negative for fatigue and fever.   Eyes: Negative for photophobia and visual disturbance.   Respiratory: Negative for cough and shortness of breath.    Cardiovascular: Negative for chest pain.   Gastrointestinal: Negative for abdominal pain, nausea and vomiting.   Genitourinary: Negative for decreased urine volume and difficulty urinating.   Musculoskeletal: Positive for back pain. Negative for neck pain.   Neurological: Positive for weakness. Negative for dizziness, numbness and headaches.   Psychiatric/Behavioral: Negative for confusion.     Objective:     Weight: 93 kg (205 lb)  Body mass index is 29.41 kg/m².  Vital Signs (Most Recent):  Temp: 98.2 °F (36.8 °C) (01/26/22 0845)  Pulse: 108 (01/26/22 0845)  Resp: 18 (01/26/22 0942)  BP: (!) 142/90 (01/26/22 0845)  SpO2: 98 % (01/26/22 0845) Vital Signs (24h Range):  Temp:  [96.5 °F (35.8 °C)-98.5 °F (36.9 °C)] 98.2 °F (36.8 °C)  Pulse:  [] 108  Resp:  [14-18] 18  SpO2:  [92 %-100 %] 98 %  BP: (133-164)/(82-92) 142/90        Neurosurgery Physical Exam  General: Well developed, well nourished, not in acute distress.   Head: Normocephalic, atraumatic.  Neck: No TTP. Full ROM.   Neurologic: Alert and oriented x 4. Thought content appropriate.  GCS: Motor:6  Verbal:5  Eyes:4   GCS Total: 15  Language: No aphasia.  Speech: No dysarthria.  Cranial nerves: Face  symmetric, tongue midline, CN II-XII grossly intact.   Eyes: Pupils equal, round, reactive to light with accomodation, EOMI.   Pulmonary: Normal respirations, no signs of respiratory distress.  Abdomen: Soft, non-distended, non-tender to palpation.  Sensory: Decreased sensation to L upper anterior and posterior thigh down to the ankle.  Motor Strength: Moves all extremities spontaneously with good tone. No abnormal movements seen.     Strength  Deltoids Triceps Biceps Wrist Extension Wrist Flexion Hand    Upper: R 5/5 5/5 5/5 5/5 5/5 5/5    L 5/5 5/5 5/5 5/5 5/5 5/5     Hip Flexion Knee Extension Knee  Flexion Dorsiflexion Plantar flexion EHL   Lower: R 4/5 5/5 5/5 5/5 5/5 5/5    L 4/5 5/5 5/5 5/5 5/5 3/5   Pain limited strength.    DTRs: 2+ and symmetric in UEs and LEs.  Clonus: Absent.  Vascular: Pulses 2+ and symmetric radial and dorsalis pedis. No LE edema.   Skin: Skin is warm, dry and intact.    Significant Labs:  Recent Labs   Lab 01/25/22  0308   *      K 4.6      CO2 22*   BUN 22*   CREATININE 0.8   CALCIUM 9.6   MG 2.0     Recent Labs   Lab 01/25/22  0307   WBC 12.86*   HGB 16.4   HCT 48.8        No results for input(s): LABPT, INR, APTT in the last 48 hours.  Microbiology Results (last 7 days)     ** No results found for the last 168 hours. **        All pertinent labs from the last 24 hours have been reviewed.    Significant Diagnostics:  I have reviewed and interpreted all pertinent imaging results/findings within the past 24 hours.    Assessment/Plan:     * Other spondylosis with radiculopathy, lumbar region  Pt is 42M with chronic low back pain who complains of one day worsening acute low back pain after turning body awkwardly while getting into car. Two months ago patient was oving table with sudden increase in back pain and has had complaints of mild left big toe weakness and pain down left leg. MRI L spine shows L4-L5 mild spondylosis with herniation of disc  paracentrally with caudal migration causing moderate neurforaminal stenosis L>R.     NSGY reconsulted to evaluate for significant, continued pain.    Plan:  -Admitted to  floor status.  -Acute pain management following for pain control.  -Imaging reviewed: MRI L spine shows L4-L5 mild spondylosis with herniation of disc paracentrally with caudal migration causing moderate neurforaminal stenosis L>R.  -A lengthy discussion was held with the patient and wife at bedside. Patient with continued pain and minimal relief; however, he is neurologically stable and without deficits. Therefore, no emergent neurosurgical intervention is indicated at this time. The patient and wife voiced understanding.  -Recommend decadron 10 mg IV once with continued pain control.  -Recommend left L4-L5 JENNIFER with IR, given patient has had relief of pain in the past with JENNIFER.  -Follow up with neurosurgery clinic to be arranged to discuss neurosurgical treatment plans as an outpatient.    Discussed with Dr. Osborn.    Thank you for your consult. I will follow-up with patient. Please contact us if you have any additional questions.    Becca Oconnor PA-C  Neurosurgery  [facility] Zheng - Neurosurgery ([facility])

## 2022-01-26 NOTE — ANESTHESIA POST-OP PAIN MANAGEMENT
Acute Pain Consult Note    Varun Graham Sr. is a 43 yo male with a PMHx of HTN and chronic lower back pain who transferred from [facility] ED for MRI lumbar spine and neurosurgery evaluation. He's been having LBP since November that worsened a couple of weeks ago. MRI L-spine done showing lower lumbar spondylosis with L4-5 left paracentral inferior disc extrusion. NSGY consulted but no surgical intervention warranted at this time. Acute Pain Service consulted for pain management while inpatient.     When seen at bedside, patient was in moderate amount of pain, attempting to find a comfortable position in bed. He states that any sort of movement worsens his pain but it is relieved by lying still. The pain spreads across his lower back and radiates down his left leg. Patient understands that his chronic LBP will not improve in the long term. He has recently seen a chronic pain doctor who gave him a steroid injection. Patient felt significantly better with that but the pain worsened when he was attempting to get out of his car. He plans to go see his chronic pain doctor when he is discharged. Disposition pending pain control at this time.       Interval: Patient slept comfortably overnight after given valium and amitriptyline. Continues to have severe pain. States he stood up next to the bed for a couple of seconds before having to lay down in bed due to pain. Wife voiced frustration that patient is essentially bed bound now and they need a more permanent solution.    Problem List:    Active Hospital Problems    Diagnosis  POA    *Other spondylosis with radiculopathy, lumbar region [M47.26]  Yes    COVID-19 virus infection [U07.1]  Yes    HTN (hypertension) [I10]  Yes      Resolved Hospital Problems   No resolved problems to display.           Objective:      Vitals   Vitals:    01/26/22 0455   BP:    Pulse:    Resp: 14   Temp:      Constitutional:       General: He is in acute distress   HENT:      Head:  Normocephalic and atraumatic.   Pulmonary:      Effort: No respiratory distress.   Musculoskeletal:         Lumbar back: Decreased range of motion (2/2 pain). Unable to perform proper exam due to patient's severe pain     Neurological:      General: No focal deficit present.      Mental Status: He is alert and oriented to person, place, and time.      Labs    Admission on 01/22/2022   Component Date Value Ref Range Status    WBC 01/22/2022 9.98  3.90 - 12.70 K/uL Final    RBC 01/22/2022 5.47  4.60 - 6.20 M/uL Final    Hemoglobin 01/22/2022 15.4  14.0 - 18.0 g/dL Final    Hematocrit 01/22/2022 45.7  40.0 - 54.0 % Final    MCV 01/22/2022 84  82 - 98 fL Final    MCH 01/22/2022 28.2  27.0 - 31.0 pg Final    MCHC 01/22/2022 33.7  32.0 - 36.0 g/dL Final    RDW 01/22/2022 11.9  11.5 - 14.5 % Final    Platelets 01/22/2022 253  150 - 450 K/uL Final    MPV 01/22/2022 9.2  9.2 - 12.9 fL Final    Immature Granulocytes 01/22/2022 0.4  0.0 - 0.5 % Final    Gran # (ANC) 01/22/2022 7.5  1.8 - 7.7 K/uL Final    Immature Grans (Abs) 01/22/2022 0.04  0.00 - 0.04 K/uL Final    Lymph # 01/22/2022 1.5  1.0 - 4.8 K/uL Final    Mono # 01/22/2022 0.8  0.3 - 1.0 K/uL Final    Eos # 01/22/2022 0.0  0.0 - 0.5 K/uL Final    Baso # 01/22/2022 0.02  0.00 - 0.20 K/uL Final    nRBC 01/22/2022 0  0 /100 WBC Final    Gran % 01/22/2022 75.6* 38.0 - 73.0 % Final    Lymph % 01/22/2022 15.3* 18.0 - 48.0 % Final    Mono % 01/22/2022 8.3  4.0 - 15.0 % Final    Eosinophil % 01/22/2022 0.2  0.0 - 8.0 % Final    Basophil % 01/22/2022 0.2  0.0 - 1.9 % Final    Differential Method 01/22/2022 Automated   Final    Sodium 01/22/2022 137  136 - 145 mmol/L Final    Potassium 01/22/2022 3.6  3.5 - 5.1 mmol/L Final    Chloride 01/22/2022 103  95 - 110 mmol/L Final    CO2 01/22/2022 26  23 - 29 mmol/L Final    Glucose 01/22/2022 101  70 - 110 mg/dL Final    BUN 01/22/2022 16  6 - 20 mg/dL Final    Creatinine 01/22/2022 0.8  0.5 - 1.4  mg/dL Final    Calcium 01/22/2022 9.3  8.7 - 10.5 mg/dL Final    Total Protein 01/22/2022 7.2  6.0 - 8.4 g/dL Final    Albumin 01/22/2022 4.1  3.5 - 5.2 g/dL Final    Total Bilirubin 01/22/2022 0.6  0.1 - 1.0 mg/dL Final    Alkaline Phosphatase 01/22/2022 60  55 - 135 U/L Final    AST 01/22/2022 20  10 - 40 U/L Final    ALT 01/22/2022 40  10 - 44 U/L Final    Anion Gap 01/22/2022 8  8 - 16 mmol/L Final    eGFR if African American 01/22/2022 >60.0  >60 mL/min/1.73 m^2 Final    eGFR if non African American 01/22/2022 >60.0  >60 mL/min/1.73 m^2 Final    Sed Rate 01/23/2022 20  0 - 23 mm/Hr Final    LD 01/23/2022 234  110 - 260 U/L Final    Ferritin 01/23/2022 562* 20.0 - 300.0 ng/mL Final    Troponin I 01/23/2022 <0.006  0.000 - 0.026 ng/mL Final    CPK 01/23/2022 71  20 - 200 U/L Final    WBC 01/23/2022 12.09  3.90 - 12.70 K/uL Final    RBC 01/23/2022 5.70  4.60 - 6.20 M/uL Final    Hemoglobin 01/23/2022 16.3  14.0 - 18.0 g/dL Final    Hematocrit 01/23/2022 47.3  40.0 - 54.0 % Final    MCV 01/23/2022 83  82 - 98 fL Final    MCH 01/23/2022 28.6  27.0 - 31.0 pg Final    MCHC 01/23/2022 34.5  32.0 - 36.0 g/dL Final    RDW 01/23/2022 11.9  11.5 - 14.5 % Final    Platelets 01/23/2022 284  150 - 450 K/uL Final    MPV 01/23/2022 9.7  9.2 - 12.9 fL Final    Immature Granulocytes 01/23/2022 0.4  0.0 - 0.5 % Final    Gran # (ANC) 01/23/2022 11.0* 1.8 - 7.7 K/uL Final    Immature Grans (Abs) 01/23/2022 0.05* 0.00 - 0.04 K/uL Final    Lymph # 01/23/2022 0.9* 1.0 - 4.8 K/uL Final    Mono # 01/23/2022 0.2* 0.3 - 1.0 K/uL Final    Eos # 01/23/2022 0.0  0.0 - 0.5 K/uL Final    Baso # 01/23/2022 0.02  0.00 - 0.20 K/uL Final    nRBC 01/23/2022 0  0 /100 WBC Final    Gran % 01/23/2022 90.7* 38.0 - 73.0 % Final    Lymph % 01/23/2022 7.4* 18.0 - 48.0 % Final    Mono % 01/23/2022 1.3* 4.0 - 15.0 % Final    Eosinophil % 01/23/2022 0.0  0.0 - 8.0 % Final    Basophil % 01/23/2022 0.2  0.0 - 1.9 % Final     Differential Method 01/23/2022 Automated   Final    BNP 01/23/2022 43  0 - 99 pg/mL Final    Prothrombin Time 01/23/2022 10.3  9.0 - 12.5 sec Final    INR 01/23/2022 0.9  0.8 - 1.2 Final    Lactate (Lactic Acid) 01/23/2022 2.6* 0.5 - 2.2 mmol/L Final    Specimen UA 01/23/2022 Urine, Clean Catch   Final    Color, UA 01/23/2022 Yellow  Yellow, Straw, Bev Final    Appearance, UA 01/23/2022 Hazy* Clear Final    pH, UA 01/23/2022 7.0  5.0 - 8.0 Final    Specific Gravity, UA 01/23/2022 1.015  1.005 - 1.030 Final    Protein, UA 01/23/2022 Negative  Negative Final    Glucose, UA 01/23/2022 Negative  Negative Final    Ketones, UA 01/23/2022 Negative  Negative Final    Bilirubin (UA) 01/23/2022 Negative  Negative Final    Occult Blood UA 01/23/2022 Negative  Negative Final    Nitrite, UA 01/23/2022 Negative  Negative Final    Leukocytes, UA 01/23/2022 Negative  Negative Final    Sodium 01/23/2022 136  136 - 145 mmol/L Final    Potassium 01/23/2022 4.2  3.5 - 5.1 mmol/L Final    Chloride 01/23/2022 103  95 - 110 mmol/L Final    CO2 01/23/2022 22* 23 - 29 mmol/L Final    Glucose 01/23/2022 198* 70 - 110 mg/dL Final    BUN 01/23/2022 18  6 - 20 mg/dL Final    Creatinine 01/23/2022 1.0  0.5 - 1.4 mg/dL Final    Calcium 01/23/2022 9.5  8.7 - 10.5 mg/dL Final    Anion Gap 01/23/2022 11  8 - 16 mmol/L Final    eGFR if African American 01/23/2022 >60.0  >60 mL/min/1.73 m^2 Final    eGFR if non African American 01/23/2022 >60.0  >60 mL/min/1.73 m^2 Final    Magnesium 01/23/2022 1.8  1.6 - 2.6 mg/dL Final    POCT Glucose 01/24/2022 132* 70 - 110 mg/dL Final    Lactate (Lactic Acid) 01/24/2022 1.0  0.5 - 2.2 mmol/L Final    WBC 01/24/2022 11.10  3.90 - 12.70 K/uL Final    RBC 01/24/2022 5.72  4.60 - 6.20 M/uL Final    Hemoglobin 01/24/2022 16.0  14.0 - 18.0 g/dL Final    Hematocrit 01/24/2022 48.2  40.0 - 54.0 % Final    MCV 01/24/2022 84  82 - 98 fL Final    MCH 01/24/2022 28.0  27.0 - 31.0 pg  Final    MCHC 01/24/2022 33.2  32.0 - 36.0 g/dL Final    RDW 01/24/2022 12.3  11.5 - 14.5 % Final    Platelets 01/24/2022 300  150 - 450 K/uL Final    MPV 01/24/2022 9.3  9.2 - 12.9 fL Final    Immature Granulocytes 01/24/2022 0.5  0.0 - 0.5 % Final    Gran # (ANC) 01/24/2022 7.4  1.8 - 7.7 K/uL Final    Immature Grans (Abs) 01/24/2022 0.06* 0.00 - 0.04 K/uL Final    Lymph # 01/24/2022 2.7  1.0 - 4.8 K/uL Final    Mono # 01/24/2022 1.0  0.3 - 1.0 K/uL Final    Eos # 01/24/2022 0.0  0.0 - 0.5 K/uL Final    Baso # 01/24/2022 0.04  0.00 - 0.20 K/uL Final    nRBC 01/24/2022 0  0 /100 WBC Final    Gran % 01/24/2022 66.2  38.0 - 73.0 % Final    Lymph % 01/24/2022 23.9  18.0 - 48.0 % Final    Mono % 01/24/2022 8.9  4.0 - 15.0 % Final    Eosinophil % 01/24/2022 0.1  0.0 - 8.0 % Final    Basophil % 01/24/2022 0.4  0.0 - 1.9 % Final    Differential Method 01/24/2022 Automated   Final    Sodium 01/24/2022 137  136 - 145 mmol/L Final    Potassium 01/24/2022 4.4  3.5 - 5.1 mmol/L Final    Chloride 01/24/2022 104  95 - 110 mmol/L Final    CO2 01/24/2022 26  23 - 29 mmol/L Final    Glucose 01/24/2022 98  70 - 110 mg/dL Final    BUN 01/24/2022 22* 6 - 20 mg/dL Final    Creatinine 01/24/2022 1.0  0.5 - 1.4 mg/dL Final    Calcium 01/24/2022 9.5  8.7 - 10.5 mg/dL Final    Total Protein 01/24/2022 7.1  6.0 - 8.4 g/dL Final    Albumin 01/24/2022 3.7  3.5 - 5.2 g/dL Final    Total Bilirubin 01/24/2022 0.3  0.1 - 1.0 mg/dL Final    Alkaline Phosphatase 01/24/2022 53* 55 - 135 U/L Final    AST 01/24/2022 12  10 - 40 U/L Final    ALT 01/24/2022 23  10 - 44 U/L Final    Anion Gap 01/24/2022 7* 8 - 16 mmol/L Final    eGFR if African American 01/24/2022 >60.0  >60 mL/min/1.73 m^2 Final    eGFR if non African American 01/24/2022 >60.0  >60 mL/min/1.73 m^2 Final    Magnesium 01/24/2022 2.0  1.6 - 2.6 mg/dL Final    Phosphorus 01/24/2022 3.7  2.7 - 4.5 mg/dL Final    LD 01/25/2022 235  110 - 260 U/L Final     Ferritin 01/25/2022 396* 20.0 - 300.0 ng/mL Final    WBC 01/25/2022 12.86* 3.90 - 12.70 K/uL Final    RBC 01/25/2022 5.86  4.60 - 6.20 M/uL Final    Hemoglobin 01/25/2022 16.4  14.0 - 18.0 g/dL Final    Hematocrit 01/25/2022 48.8  40.0 - 54.0 % Final    MCV 01/25/2022 83  82 - 98 fL Final    MCH 01/25/2022 28.0  27.0 - 31.0 pg Final    MCHC 01/25/2022 33.6  32.0 - 36.0 g/dL Final    RDW 01/25/2022 12.0  11.5 - 14.5 % Final    Platelets 01/25/2022 298  150 - 450 K/uL Final    MPV 01/25/2022 9.6  9.2 - 12.9 fL Final    Immature Granulocytes 01/25/2022 0.9* 0.0 - 0.5 % Final    Gran # (ANC) 01/25/2022 10.4* 1.8 - 7.7 K/uL Final    Immature Grans (Abs) 01/25/2022 0.12* 0.00 - 0.04 K/uL Final    Lymph # 01/25/2022 1.7  1.0 - 4.8 K/uL Final    Mono # 01/25/2022 0.6  0.3 - 1.0 K/uL Final    Eos # 01/25/2022 0.0  0.0 - 0.5 K/uL Final    Baso # 01/25/2022 0.03  0.00 - 0.20 K/uL Final    nRBC 01/25/2022 0  0 /100 WBC Final    Gran % 01/25/2022 81.0* 38.0 - 73.0 % Final    Lymph % 01/25/2022 12.9* 18.0 - 48.0 % Final    Mono % 01/25/2022 5.0  4.0 - 15.0 % Final    Eosinophil % 01/25/2022 0.0  0.0 - 8.0 % Final    Basophil % 01/25/2022 0.2  0.0 - 1.9 % Final    Differential Method 01/25/2022 Automated   Final    Sodium 01/25/2022 137  136 - 145 mmol/L Final    Potassium 01/25/2022 4.6  3.5 - 5.1 mmol/L Final    Chloride 01/25/2022 104  95 - 110 mmol/L Final    CO2 01/25/2022 22* 23 - 29 mmol/L Final    Glucose 01/25/2022 155* 70 - 110 mg/dL Final    BUN 01/25/2022 22* 6 - 20 mg/dL Final    Creatinine 01/25/2022 0.8  0.5 - 1.4 mg/dL Final    Calcium 01/25/2022 9.6  8.7 - 10.5 mg/dL Final    Anion Gap 01/25/2022 11  8 - 16 mmol/L Final    eGFR if African American 01/25/2022 >60.0  >60 mL/min/1.73 m^2 Final    eGFR if non African American 01/25/2022 >60.0  >60 mL/min/1.73 m^2 Final    Magnesium 01/25/2022 2.0  1.6 - 2.6 mg/dL Final    SARS-CoV2 (COVID-19) Qualitative P* 01/25/2022 Detected* Not  Detected Final        Meds   Current Facility-Administered Medications   Medication Dose Route Frequency Provider Last Rate Last Admin    acetaminophen tablet 1,000 mg  1,000 mg Oral Q8H Ko Hickey MD   1,000 mg at 01/25/22 2228    acetaminophen tablet 650 mg  650 mg Oral Q8H PRN Meg Murrell NP        amitriptyline tablet 50 mg  50 mg Oral QHS Ko Hickey MD   50 mg at 01/25/22 2126    ascorbic acid (vitamin C) tablet 500 mg  500 mg Oral BID Meg Murrell NP   500 mg at 01/25/22 2127    baclofen tablet 5 mg  5 mg Oral TID Rubin Frausto MD   5 mg at 01/25/22 2127    celecoxib capsule 200 mg  200 mg Oral Daily Ko Hickey MD   200 mg at 01/25/22 1544    dexAMETHasone tablet 4 mg  4 mg Oral Q8H Gita Michaels PA-C   4 mg at 01/26/22 0545    dextrose 50% injection 12.5 g  12.5 g Intravenous PRN Meg Murrell NP        dextrose 50% injection 25 g  25 g Intravenous PRN Meg Murrell NP        enoxaparin injection 40 mg  40 mg Subcutaneous Q24H Meg Murrell NP   40 mg at 01/25/22 1800    gabapentin capsule 900 mg  900 mg Oral TID Rubin Frausto MD   900 mg at 01/25/22 2240    glucagon (human recombinant) injection 1 mg  1 mg Intramuscular PRN Meg Murrell NP        glucose chewable tablet 16 g  16 g Oral PRN Meg Murrell NP        glucose chewable tablet 24 g  24 g Oral PRN Meg Murrell NP        LIDOcaine 5 % patch 3 patch  3 patch Transdermal Q24H Rubin Frausto MD   3 patch at 01/25/22 1222    lisinopriL tablet 5 mg  5 mg Oral Daily Meg Murrell NP   5 mg at 01/25/22 0904    melatonin tablet 6 mg  6 mg Oral Nightly PRN Meg Murrell NP   6 mg at 01/23/22 2051    methocarbamoL tablet 1,000 mg  1,000 mg Oral QID Meg Murrell NP   1,000 mg at 01/25/22 2126    multivitamin tablet  1 tablet Oral Daily Meg Murrell NP   1 tablet at 01/25/22 0905    oxyCODONE immediate release tablet Tab 10 mg  10 mg Oral Q6H PRN  Ko Hickey MD   10 mg at 01/25/22 2229    polyethylene glycol packet 17 g  17 g Oral TID PRN Rubin Frausto MD   17 g at 01/25/22 1221    senna-docusate 8.6-50 mg per tablet 1 tablet  1 tablet Oral Daily Rubin Frausto MD   1 tablet at 01/25/22 0905    sodium chloride 0.9% flush 10 mL  10 mL Intravenous PRN Violette Pérez MD        sodium chloride 0.9% flush 10 mL  10 mL Intravenous PRN Meg Murrell NP                Assessment:     Pain control inadequate. No surgical intervention warranted by NSGY at this time.    Prior to consultation, patient was on acetaminophen 1g q8h, baclofen 5mg TID, dexamethasone 4mg q8h PO, gabapentin 900mg TID, lidocaine patch x 3, robaxin 1000mg QID with PRN oxycodone 10mg q6h PRN and morphine 3mg q3h PRN      Plan:  -- continue acetaminophen, robaxin, baclofen, dexamethasone, celebrex 200mg QD, and amitriptyline 50mg QHS.    -- NSGY re-evaluated patient and state surgical intervention is not warranted at this time. Patient can get surgery as outpatient.   -- discussed with Interventional Pain at [facility] regarding patient's pain and recommendation is for Primary Team to consult Interventional Radiology, as they may be able to perform epidural steroid injection for patient while he is inpatient at [facility]       Thank you for involving us in the care of Mr. Varun Graham. We will continue to follow. Please do not make any changes to patient's pain medications. The Acute Pain Service will manage medications. Call [phone] with any questions/concerns.        Ko Hickey MD  Department of Anesthesiology  [id number]/[id number]  Pager: [phone]

## 2022-01-26 NOTE — PT/OT/SLP PROGRESS
Physical Therapy      Patient Name:  Varun MANDEL Santos Scherer   MRN:  [id number]    Patient not seen today secondary to pt continues to have increased pain with mobility; unable to tolerate EOB/OOB activity this date. Will follow-up at next scheduled session as able.    1/26/2022

## 2022-01-26 NOTE — PT/OT/SLP PROGRESS
Occupational Therapy   Treatment    Name: Varun Graham Sr.  MRN: [id number]  Admitting Diagnosis:  Other spondylosis with radiculopathy, lumbar region       Recommendations:     Discharge Recommendations: outpatient PT  Discharge Equipment Recommendations:  bedside commode,walker, rolling  Barriers to discharge:  None    Assessment:     Varun Graham Sr. is a 42 y.o. male with a medical diagnosis of Other spondylosis with radiculopathy, lumbar region.  He presents with limited session due to pain.  Goals remain appropriate. Performance deficits affecting function are weakness,impaired endurance,impaired self care skills,impaired functional mobilty,impaired balance,pain.     Rehab Prognosis:  Pending pain levels; patient would benefit from acute skilled OT services to address these deficits and reach maximum level of function.       Plan:     Patient to be seen 3 x/week to address the above listed problems via self-care/home management,therapeutic activities,therapeutic exercises,neuromuscular re-education  · Plan of Care Expires: 02/23/22  · Plan of Care Reviewed with: patient,spouse    Subjective   Pt reported that his wife massages his leg when the pain gets bad & that it helps.  Pain/Comfort:  · Pain Rating 1:  (did not rate)  · Location 1: back  · Pain Rating Post-Intervention 1:  (did not rate)    Objective:     Communicated with: RN prior to session.  Patient found supine with telemetry (spouse present in room) upon OT entry to room.    General Precautions: Standard, airborne,contact,droplet,fall   Orthopedic Precautions:spinal precautions   Braces: N/A     Occupational Performance:     [facility] 6 Click ADL: 15    Treatment & Education:  No mobility performed on this date due to pt declined mobility due to significant pain with any and all movement.  Provided education on techniques to facilitate decreased tension & attempts to relax body to facilitate decreased pain via meditation & deep breathing.  Provided  education on why deep breathing assists with pain management.  Pt's wife reported that he massages his legs when the pain gets bad with noted positive effect (to decrease tension in LE).  Provided education that light massage is fine as long as no DVT is present or if MD recommends that she should not perform.  Pt had no further questions & when asked whether there were any concerns pt reported none.      Patient left supine with all lines intact, call button in reach, RN notified and spouse presentEducation:      GOALS:   Multidisciplinary Problems     Occupational Therapy Goals        Problem: Occupational Therapy Goal    Goal Priority Disciplines Outcome Interventions   Occupational Therapy Goal     OT, PT/OT Ongoing, Progressing    Description: Goals to be met by: 2/4     Patient will increase functional independence with ADLs by performing:    UE Dressing with Supervision.  LE Dressing with Minimal Assistance.  Grooming while standing with Supervision.  Toileting from bedside commode with Supervision for hygiene and clothing management.   Rolling to Bilateral with [place].   Supine to sit with Stand-by Assistance.  Stand pivot transfers with Stand-by Assistance.                     Time Tracking:     OT Date of Treatment: 01/26/22  OT Start Time: 1441  OT Stop Time: 1456  OT Total Time (min): 15 min    Billable Minutes:Therapeutic Activity 14    OT/COBY: OT          1/26/2022

## 2022-01-26 NOTE — SUBJECTIVE & OBJECTIVE
Interval History: He continued to have significant pain. Neuro symptoms are same. Radicular burning and shooting pain on left side. No urinary retention, saddle anesthesia, significant weakness or numbness noted. He has mild left LE weakness due to pain. Unable to move around due to pain. He is laying prone in bed and turning to the side to eat. He is currently having minimal relief from meds, and he doesn't not prefer to keep taking all these meds. He continue to request for surgery. NSGY revisited him today, and rec no surgery with no FND. Pain mgmt now signed. Both consultants required IR neuro for JENNIFER. I called IR neuro fellow and dicussed case. Hopeful this would be done, otherwise not sure what else we can do here for him.      Review of Systems   Constitutional: Negative for appetite change, chills, fatigue, fever and unexpected weight change.   HENT: Negative for congestion, rhinorrhea, sore throat and trouble swallowing.    Eyes: Negative for photophobia and visual disturbance.   Respiratory: Negative for cough, shortness of breath and wheezing.    Cardiovascular: Negative for chest pain, palpitations and leg swelling.   Gastrointestinal: Negative for abdominal distention, abdominal pain, diarrhea, nausea and vomiting.   Genitourinary: Negative for dysuria, flank pain, frequency and hematuria.   Musculoskeletal: Positive for back pain, gait problem and myalgias. Negative for arthralgias and neck pain.   Skin: Negative for color change and rash.   Neurological: Positive for left leg weakness and radicular pain  Negative for dizziness, speech difficulty, weakness and light-headedness.   Psychiatric/Behavioral: Negative for agitation, confusion and suicidal ideas. The patient is not nervous/anxious.        Objective:     Vital Signs (Most Recent):  Temp: 98.3 °F (36.8 °C) (01/26/22 1228)  Pulse: 102 (01/26/22 1228)  Resp: 18 (01/26/22 1228)  BP: (!) 144/89 (01/26/22 1228)  SpO2: 96 % (01/26/22 1228) Vital  Signs (24h Range):  Temp:  [96.5 °F (35.8 °C)-98.5 °F (36.9 °C)] 98.3 °F (36.8 °C)  Pulse:  [] 102  Resp:  [14-18] 18  SpO2:  [92 %-100 %] 96 %  BP: (133-156)/(82-90) 144/89     Weight: 93 kg (205 lb)  Body mass index is 29.41 kg/m².    Intake/Output Summary (Last 24 hours) at 1/26/2022 1534  Last data filed at 1/26/2022 0600  Gross per 24 hour   Intake --   Output 1475 ml   Net -1475 ml      Physical Exam  Vitals and nursing note reviewed.   Constitutional:       General: He is in acute distress (mild).      Appearance: He is not toxic-appearing or diaphoretic.   HENT:      Head: Normocephalic and atraumatic.      Nose: Nose normal.      Mouth/Throat:      Mouth: Mucous membranes are moist.   Eyes:      Pupils: Pupils are equal, round, and reactive to light.   Cardiovascular:      Rate and Rhythm: Normal rate and regular rhythm.      Heart sounds: No murmur heard.  Pulmonary:      Effort: No respiratory distress.      Breath sounds: No wheezing, rhonchi or rales.   Abdominal:      General: Bowel sounds are normal. There is no distension.      Palpations: Abdomen is soft.      Tenderness: There is no abdominal tenderness. There is no guarding.   Musculoskeletal:         General: No swelling or deformity.      Cervical back: Normal range of motion. No tenderness.      Thoracic back: No tenderness.      Lumbar back: Point tenderness. Decreased range of motion (2/2 pain).  Comments: Exam limited 2/2 to patient laying on supine due to pain from muscle spasms   Skin:     General: Skin is warm and dry.      Capillary Refill: Capillary refill takes less than 2 seconds.   Neurological:      General: No focal deficit present.      Mental Status: He is alert and oriented to person, place, and time.      Cranial Nerves: No dysarthria or facial asymmetry.      Motor: No weakness or tremor.      Mild sensation deficit on LLE near and below ankle     Mild left LE weakness noted   LE reflexs in tact and symmetric    Psychiatric:    Emotionally labile due to pain and current issues      Significant Labs: All pertinent labs within the past 24 hours have been reviewed.     Significant Imaging: I have reviewed all pertinent imaging results/findings within the past 24 hours

## 2022-01-26 NOTE — PROGRESS NOTES
[facility] - Neurosurgery ([facility] Medicine  Progress Note    Patient Name: Varun Graham Sr.  MRN: [id number]  Patient Class: IP- Inpatient   Admission Date: 1/22/2022  Length of Stay: 1 days  Attending Physician: Rubin Frausto MD  Primary Care Provider: Lamont Venegas MD      Subjective:     Principal Problem:Other spondylosis with radiculopathy, lumbar region      HPI:  Varun Graham Sr. Is a  42 y.o. male with a PMHx of HTN and chronic lower back pain who transferred from [facility] for MRI lumbar spine and neurosurgery evaluation. The patient reports lower back pain beginning in November that worsened a few weeks ago. He reports being seen by [facility] and PT and MRI spine were recommended at that time. He was unable to get insurance approval for MRI due to not completing physical therapy prior. He received a steroid injection on Wednesday of last week and reports feeling great after and being pain free for several days. He states that Friday evening as he was walking from his truck he began experiencing 10/10 shooting pain. He describes the the pain as shooting pain to the left side of his lower back radiating down his left leg from his groin to heel. He endorses decreased sensation to his left foot which is unchanged. He also endorses intermittent muscle spasms in his lower back as well. He reports the pain has been so severe that he is unable to walk at this point. He states certain positions make the pain worse and the steroid injection was the only thing that provided relief. He has tried gabapentin, robaxin, zanaflex, percocet and prednisone with limited benefit.The patient denies any bowel or bladder incontinence or saddle anesthesia. The patient denies any fever, chills, shortness of breath, cough, chest pain, abdominal pain, dizziness, lightheadedness, or dysuria.    In the ED, patient hypertensive and mildly tachypneic in the ED, pain likely contributing.  Otherwise VSSAF. CBC and CMP unremarkable. CXR with no acute process. COVID-19 positive. MRI lumbar spine with lower lumbar spondylosis with L4-5 left paracentral inferior disc extrusion extending 1.0 cm caudal to the L5 superior endplate resulting in mild spinal canal narrowing and bilateral moderate neural foraminal narrowing.  Extrusion compresses the exiting L4 nerve and closely approximates the descending L5 nerve root which likely contributes to patient's reported symptoms. NSGY initially accepted the patient for transfer but reviewed imaging and recommended no surgical intervention and observation under hospital medicine for pain control and PT/OT.      Overview/Hospital Course:  42 y.o. male with a PMHx of HTN and chronic lower back pain who transferred from [facility] for MRI lumbar spine and neurosurgery evaluation. MRI lumbar spine with lower lumbar spondylosis with L4-5 left paracentral inferior disc extrusion extending 1.0 cm caudal to the L5 superior endplate resulting in mild spinal canal narrowing and bilateral moderate neural foraminal narrowing (as per read). Extrusion compresses the exiting L4 nerve and closely approximates the descending L5 nerve root. NSGY consulted, no acute interventions. Pain mgmt recommended. He has had no relief despite several scheduled muscle relaxants, pain management and narcotics. Dicussed LSO brace with NSGY, but they recommend against this as this may worsen his condition. Acute pain consulted as he continues to have 10/10 pain, they added few other multimodal pain meds but no change in pain. NSGY and Acute pain recs neuro IR for JENNIFER which can improve his symptoms then hopefully outpatient surgery. At this time, neuro IR has been consulted.       Interval History: He continued to have significant pain. Neuro symptoms are same. Radicular burning and shooting pain on left side. No urinary retention, saddle anesthesia, significant weakness or numbness noted. He has  mild left LE weakness due to pain. Unable to move around due to pain. He is laying prone in bed and turning to the side to eat. He is currently having minimal relief from meds, and he doesn't not prefer to keep taking all these meds. He continue to request for surgery. NSGY revisited him today, and rec no surgery with no FND. Pain mgmt now signed. Both consultants required IR neuro for JENNIFER. I called IR neuro fellow and dicussed case. Hopeful this would be done, otherwise not sure what else we can do here for him.      Review of Systems   Constitutional: Negative for appetite change, chills, fatigue, fever and unexpected weight change.   HENT: Negative for congestion, rhinorrhea, sore throat and trouble swallowing.    Eyes: Negative for photophobia and visual disturbance.   Respiratory: Negative for cough, shortness of breath and wheezing.    Cardiovascular: Negative for chest pain, palpitations and leg swelling.   Gastrointestinal: Negative for abdominal distention, abdominal pain, diarrhea, nausea and vomiting.   Genitourinary: Negative for dysuria, flank pain, frequency and hematuria.   Musculoskeletal: Positive for back pain, gait problem and myalgias. Negative for arthralgias and neck pain.   Skin: Negative for color change and rash.   Neurological: Positive for left leg weakness and radicular pain  Negative for dizziness, speech difficulty, weakness and light-headedness.   Psychiatric/Behavioral: Negative for agitation, confusion and suicidal ideas. The patient is not nervous/anxious.        Objective:     Vital Signs (Most Recent):  Temp: 98.3 °F (36.8 °C) (01/26/22 1228)  Pulse: 102 (01/26/22 1228)  Resp: 18 (01/26/22 1228)  BP: (!) 144/89 (01/26/22 1228)  SpO2: 96 % (01/26/22 1228) Vital Signs (24h Range):  Temp:  [96.5 °F (35.8 °C)-98.5 °F (36.9 °C)] 98.3 °F (36.8 °C)  Pulse:  [] 102  Resp:  [14-18] 18  SpO2:  [92 %-100 %] 96 %  BP: (133-156)/(82-90) 144/89     Weight: 93 kg (205 lb)  Body mass index  is 29.41 kg/m².    Intake/Output Summary (Last 24 hours) at 1/26/2022 1534  Last data filed at 1/26/2022 0600  Gross per 24 hour   Intake --   Output 1475 ml   Net -1475 ml      Physical Exam  Vitals and nursing note reviewed.   Constitutional:       General: He is in acute distress (mild).      Appearance: He is not toxic-appearing or diaphoretic.   HENT:      Head: Normocephalic and atraumatic.      Nose: Nose normal.      Mouth/Throat:      Mouth: Mucous membranes are moist.   Eyes:      Pupils: Pupils are equal, round, and reactive to light.   Cardiovascular:      Rate and Rhythm: Normal rate and regular rhythm.      Heart sounds: No murmur heard.  Pulmonary:      Effort: No respiratory distress.      Breath sounds: No wheezing, rhonchi or rales.   Abdominal:      General: Bowel sounds are normal. There is no distension.      Palpations: Abdomen is soft.      Tenderness: There is no abdominal tenderness. There is no guarding.   Musculoskeletal:         General: No swelling or deformity.      Cervical back: Normal range of motion. No tenderness.      Thoracic back: No tenderness.      Lumbar back: Point tenderness. Decreased range of motion (2/2 pain).  Comments: Exam limited 2/2 to patient laying on supine due to pain from muscle spasms   Skin:     General: Skin is warm and dry.      Capillary Refill: Capillary refill takes less than 2 seconds.   Neurological:      General: No focal deficit present.      Mental Status: He is alert and oriented to person, place, and time.      Cranial Nerves: No dysarthria or facial asymmetry.      Motor: No weakness or tremor.      Mild sensation deficit on LLE near and below ankle     Mild left LE weakness noted   LE reflexs in tact and symmetric   Psychiatric:    Emotionally labile due to pain and current issues      Significant Labs: All pertinent labs within the past 24 hours have been reviewed.     Significant Imaging: I have reviewed all pertinent imaging results/findings  within the past 24 hours      Assessment/Plan:      * Other spondylosis with radiculopathy, lumbar region  - Acute on chronic issue. He reports the back pain began in November and worsened a few weeks ago. He received a steroid injection on Wednesday of last week and reports feeling great after and being pain free for several days. He states that Friday evening as he was walking from his truck he began experiencing 10/10 shooting pain. He describes the the pain as shooting pain to the left side of his lower back radiating down his left leg from his groin to heel. He endorses decreased sensation to his left foot which is unchanged. He also endorses intermittent muscle spasms in his lower back as well. He reports the pain has been so severe that he is unable to walk at this point. Per ED provider NSGY recommends no surgical intervention at this time  -Reviewed MRI-Lower lumbar spondylosis with L4-5 left paracentral inferior disc extrusion extending 1.0 cm caudal to the L5 superior endplate resulting in mild spinal canal narrowing and bilateral moderate neural foraminal narrowing.  Extrusion compresses the exiting L4 nerve and closely approximates the descending L5 nerve root which likely contributes to patient's reported symptoms.  - with significant pain, requested NSGY re-evaluate patient again, still no surgincal intervention noted.  -continue scheduled robaxin 1000mg qid, scheduled toradol 15mg q6hrs, tylenol 1000mg q8hrs, and lidocaine patch x 3  -Added baclofen  -Increased gabapentin to 900mg tid  -Acute pain consulted as he continues to have 10/10 pain, added few other meds, now signed off, Rec IR for JENNIFER  -IR consulted  -PRN oxy IR for breakthrough pain  -PT/OT    HTN (hypertension)  -Patient initially hypertensive, pain is likely contributing as well as not taking home antihypertensive.  -Was previously placed on amlodipine but reports he stopped taking it because he had headaches, agreeable to trying  different medication.  -continue lisinopril 5mg daily  - pain mgmt     COVID-19 virus infection  - Asymptomatic and incidental  - Monitor for symptoms       VTE Risk Mitigation (From admission, onward)         Ordered     enoxaparin injection 40 mg  Every 24 hours (non-standard times)         01/23/22 0310     IP VTE HIGH RISK PATIENT  Once         01/23/22 0601     Place sequential compression device  Until discontinued         01/23/22 0601                Discharge Planning   TOM: 1/27/2022     Code Status: Full Code   Is the patient medically ready for discharge?: No    Reason for patient still in hospital (select all that apply): Patient trending condition  Discharge Plan A: Home with family          Rubin Frausto MD  Department of Hospital Medicine   [facility] - Neurosurgery ([facility])

## 2022-01-26 NOTE — ASSESSMENT & PLAN NOTE
- Acute on chronic issue. He reports the back pain began in November and worsened a few weeks ago. He received a steroid injection on Wednesday of last week and reports feeling great after and being pain free for several days. He states that Friday evening as he was walking from his truck he began experiencing 10/10 shooting pain. He describes the the pain as shooting pain to the left side of his lower back radiating down his left leg from his groin to heel. He endorses decreased sensation to his left foot which is unchanged. He also endorses intermittent muscle spasms in his lower back as well. He reports the pain has been so severe that he is unable to walk at this point. Per ED provider NSGY recommends no surgical intervention at this time  -Reviewed MRI-Lower lumbar spondylosis with L4-5 left paracentral inferior disc extrusion extending 1.0 cm caudal to the L5 superior endplate resulting in mild spinal canal narrowing and bilateral moderate neural foraminal narrowing.  Extrusion compresses the exiting L4 nerve and closely approximates the descending L5 nerve root which likely contributes to patient's reported symptoms.  - with significant pain, requested NSGY re-evaluate patient again, still no surgincal intervention noted.  -continue scheduled robaxin 1000mg qid, scheduled toradol 15mg q6hrs, tylenol 1000mg q8hrs, and lidocaine patch x 3  -Added baclofen  -Increased gabapentin to 900mg tid  -Acute pain consulted as he continues to have 10/10 pain, added few other meds, now signed off, Rec IR for JENNIFER  -IR consulted  -PRN oxy IR for breakthrough pain  -PT/OT

## 2022-01-26 NOTE — ASSESSMENT & PLAN NOTE
Pt is 42M with chronic low back pain who complains of one day worsening acute low back pain after turning body awkwardly while getting into car. Two months ago patient was oving table with sudden increase in back pain and has had complaints of mild left big toe weakness and pain down left leg. MRI L spine shows L4-L5 mild spondylosis with herniation of disc paracentrally with caudal migration causing moderate neurforaminal stenosis L>R.     NSGY reconsulted to evaluate for significant, continued pain.    Plan:  -Admitted to  floor status.  -Acute pain management following for pain control.  -Imaging reviewed: MRI L spine shows L4-L5 mild spondylosis with herniation of disc paracentrally with caudal migration causing moderate neurforaminal stenosis L>R.  -A lengthy discussion was held with the patient and wife at bedside. Patient with continued pain and minimal relief; however, he is neurologically stable and without deficits. Therefore, no emergent neurosurgical intervention is indicated at this time. The patient and wife voiced understanding.  -Recommend decadron 10 mg IV once with continued pain control.  -Recommend left L4-L5 JENNIFER with IR, given patient has had relief of pain in the past with JENNIFER.  -Follow up with neurosurgery clinic to be arranged to discussed neurosurgical treatment plans as an outpatient.

## 2022-01-26 NOTE — SUBJECTIVE & OBJECTIVE
Medications Prior to Admission   Medication Sig Dispense Refill Last Dose    amLODIPine (NORVASC) 5 MG tablet Take 1 tablet (5 mg total) by mouth once daily. 30 tablet 5     gabapentin (NEURONTIN) 300 MG capsule Take 1 capsule (300 mg total) by mouth 3 (three) times daily. 30 capsule 1     HYDROcodone-acetaminophen (NORCO) 5-325 mg per tablet Take 1 tablet by mouth every 8 (eight) hours as needed (breakthrough pain). 15 tablet 0     ketorolac (TORADOL) 10 mg tablet Take 1 tablet (10 mg total) by mouth every 6 (six) hours as needed for Pain. Do not take with other NSAIDs such as meloxicam, ibuprofen, or naproxen 12 tablet 0     LIDOcaine (LIDODERM) 5 % Place 1 patch onto the skin once daily. Remove & Discard patch within 12 hours or as directed by MD 30 patch 0     meloxicam (MOBIC) 15 MG tablet Take 1 tablet (15 mg total) by mouth once daily. 30 tablet 1     oxyCODONE-acetaminophen (PERCOCET) 5-325 mg per tablet Take 1 tablet by mouth every 4 (four) hours as needed for Pain. 15 tablet 0     predniSONE (DELTASONE) 20 MG tablet Take 1 tablet (20 mg total) by mouth once daily. 5 tablet 0        Review of patient's allergies indicates:  No Known Allergies    Past Medical History:   Diagnosis Date    Chronic back pain      Past Surgical History:   Procedure Laterality Date    APPENDECTOMY  1996     Family History     Problem Relation (Age of Onset)    Diabetes Father    Heart attack Paternal Grandfather (67)        Tobacco Use    Smoking status: Current Every Day Smoker     Packs/day: 1.00     Types: Cigarettes    Smokeless tobacco: Never Used   Substance and Sexual Activity    Alcohol use: No    Drug use: No    Sexual activity: Yes     Partners: Female     Review of Systems   Constitutional: Negative for fatigue and fever.   Eyes: Negative for photophobia and visual disturbance.   Respiratory: Negative for cough and shortness of breath.    Cardiovascular: Negative for chest pain.   Gastrointestinal:  Negative for abdominal pain, nausea and vomiting.   Genitourinary: Negative for decreased urine volume and difficulty urinating.   Musculoskeletal: Positive for back pain. Negative for neck pain.   Neurological: Positive for weakness. Negative for dizziness, numbness and headaches.   Psychiatric/Behavioral: Negative for confusion.     Objective:     Weight: 93 kg (205 lb)  Body mass index is 29.41 kg/m².  Vital Signs (Most Recent):  Temp: 98.2 °F (36.8 °C) (01/26/22 0845)  Pulse: 108 (01/26/22 0845)  Resp: 18 (01/26/22 0942)  BP: (!) 142/90 (01/26/22 0845)  SpO2: 98 % (01/26/22 0845) Vital Signs (24h Range):  Temp:  [96.5 °F (35.8 °C)-98.5 °F (36.9 °C)] 98.2 °F (36.8 °C)  Pulse:  [] 108  Resp:  [14-18] 18  SpO2:  [92 %-100 %] 98 %  BP: (133-164)/(82-92) 142/90        Neurosurgery Physical Exam  General: Well developed, well nourished, not in acute distress.   Head: Normocephalic, atraumatic.  Neck: No TTP. Full ROM.   Neurologic: Alert and oriented x 4. Thought content appropriate.  GCS: Motor:6  Verbal:5  Eyes:4   GCS Total: 15  Language: No aphasia.  Speech: No dysarthria.  Cranial nerves: Face symmetric, tongue midline, CN II-XII grossly intact.   Eyes: Pupils equal, round, reactive to light with accomodation, EOMI.   Pulmonary: Normal respirations, no signs of respiratory distress.  Abdomen: Soft, non-distended, non-tender to palpation.  Sensory: Decreased sensation to L upper anterior and posterior thigh down to the ankle.  Motor Strength: Moves all extremities spontaneously with good tone. No abnormal movements seen.     Strength  Deltoids Triceps Biceps Wrist Extension Wrist Flexion Hand    Upper: R 5/5 5/5 5/5 5/5 5/5 5/5    L 5/5 5/5 5/5 5/5 5/5 5/5     Hip Flexion Knee Extension Knee  Flexion Dorsiflexion Plantar flexion EHL   Lower: R 4/5 5/5 5/5 5/5 5/5 5/5    L 4/5 5/5 5/5 5/5 5/5 3/5   Pain limited strength.    DTRs: 2+ and symmetric in UEs and LEs.  Clonus: Absent.  Vascular: Pulses 2+ and  symmetric radial and dorsalis pedis. No LE edema.   Skin: Skin is warm, dry and intact.    Significant Labs:  Recent Labs   Lab 01/25/22  0308   *      K 4.6      CO2 22*   BUN 22*   CREATININE 0.8   CALCIUM 9.6   MG 2.0     Recent Labs   Lab 01/25/22  0307   WBC 12.86*   HGB 16.4   HCT 48.8        No results for input(s): LABPT, INR, APTT in the last 48 hours.  Microbiology Results (last 7 days)     ** No results found for the last 168 hours. **        All pertinent labs from the last 24 hours have been reviewed.    Significant Diagnostics:  I have reviewed and interpreted all pertinent imaging results/findings within the past 24 hours.

## 2022-01-27 ENCOUNTER — TELEPHONE (OUTPATIENT)
Dept: PRIMARY CARE CLINIC | Facility: CLINIC | Age: 42
End: 2022-01-27
Payer: COMMERCIAL

## 2022-01-27 ENCOUNTER — PATIENT MESSAGE (OUTPATIENT)
Dept: PRIMARY CARE CLINIC | Facility: CLINIC | Age: 42
End: 2022-01-27
Payer: COMMERCIAL

## 2022-01-27 VITALS
RESPIRATION RATE: 16 BRPM | DIASTOLIC BLOOD PRESSURE: 77 MMHG | TEMPERATURE: 98 F | SYSTOLIC BLOOD PRESSURE: 134 MMHG | BODY MASS INDEX: 29.35 KG/M2 | WEIGHT: 205 LBS | HEIGHT: 70 IN | HEART RATE: 82 BPM | OXYGEN SATURATION: 96 %

## 2022-01-27 LAB
ANION GAP SERPL CALC-SCNC: 6 MMOL/L (ref 8–16)
BASOPHILS # BLD AUTO: 0.08 K/UL (ref 0–0.2)
BASOPHILS NFR BLD: 0.6 % (ref 0–1.9)
BUN SERPL-MCNC: 24 MG/DL (ref 6–20)
CALCIUM SERPL-MCNC: 9.4 MG/DL (ref 8.7–10.5)
CHLORIDE SERPL-SCNC: 104 MMOL/L (ref 95–110)
CO2 SERPL-SCNC: 26 MMOL/L (ref 23–29)
CREAT SERPL-MCNC: 0.9 MG/DL (ref 0.5–1.4)
DIFFERENTIAL METHOD: ABNORMAL
EOSINOPHIL # BLD AUTO: 0 K/UL (ref 0–0.5)
EOSINOPHIL NFR BLD: 0 % (ref 0–8)
ERYTHROCYTE [DISTWIDTH] IN BLOOD BY AUTOMATED COUNT: 12.5 % (ref 11.5–14.5)
EST. GFR  (AFRICAN AMERICAN): >60 ML/MIN/1.73 M^2
EST. GFR  (NON AFRICAN AMERICAN): >60 ML/MIN/1.73 M^2
FERRITIN SERPL-MCNC: 450 NG/ML (ref 20–300)
GLUCOSE SERPL-MCNC: 107 MG/DL (ref 70–110)
HCT VFR BLD AUTO: 51.5 % (ref 40–54)
HGB BLD-MCNC: 17.1 G/DL (ref 14–18)
IMM GRANULOCYTES # BLD AUTO: 0.26 K/UL (ref 0–0.04)
IMM GRANULOCYTES NFR BLD AUTO: 1.8 % (ref 0–0.5)
LDH SERPL L TO P-CCNC: 193 U/L (ref 110–260)
LYMPHOCYTES # BLD AUTO: 1.8 K/UL (ref 1–4.8)
LYMPHOCYTES NFR BLD: 12.4 % (ref 18–48)
MAGNESIUM SERPL-MCNC: 2 MG/DL (ref 1.6–2.6)
MCH RBC QN AUTO: 28.1 PG (ref 27–31)
MCHC RBC AUTO-ENTMCNC: 33.2 G/DL (ref 32–36)
MCV RBC AUTO: 85 FL (ref 82–98)
MONOCYTES # BLD AUTO: 0.9 K/UL (ref 0.3–1)
MONOCYTES NFR BLD: 6.2 % (ref 4–15)
NEUTROPHILS # BLD AUTO: 11.5 K/UL (ref 1.8–7.7)
NEUTROPHILS NFR BLD: 79 % (ref 38–73)
NRBC BLD-RTO: 0 /100 WBC
PLATELET # BLD AUTO: 292 K/UL (ref 150–450)
PMV BLD AUTO: 9.4 FL (ref 9.2–12.9)
POTASSIUM SERPL-SCNC: 4.8 MMOL/L (ref 3.5–5.1)
RBC # BLD AUTO: 6.08 M/UL (ref 4.6–6.2)
SODIUM SERPL-SCNC: 136 MMOL/L (ref 136–145)
WBC # BLD AUTO: 14.54 K/UL (ref 3.9–12.7)

## 2022-01-27 PROCEDURE — 36415 COLL VENOUS BLD VENIPUNCTURE: CPT | Performed by: NURSE PRACTITIONER

## 2022-01-27 PROCEDURE — 83735 ASSAY OF MAGNESIUM: CPT | Performed by: NURSE PRACTITIONER

## 2022-01-27 PROCEDURE — 25000003 PHARM REV CODE 250: Performed by: INTERNAL MEDICINE

## 2022-01-27 PROCEDURE — 63600175 PHARM REV CODE 636 W HCPCS: Performed by: PHYSICIAN ASSISTANT

## 2022-01-27 PROCEDURE — 25000003 PHARM REV CODE 250: Performed by: STUDENT IN AN ORGANIZED HEALTH CARE EDUCATION/TRAINING PROGRAM

## 2022-01-27 PROCEDURE — 25000003 PHARM REV CODE 250: Performed by: NURSE PRACTITIONER

## 2022-01-27 PROCEDURE — 85025 COMPLETE CBC W/AUTO DIFF WBC: CPT | Performed by: NURSE PRACTITIONER

## 2022-01-27 PROCEDURE — 83615 LACTATE (LD) (LDH) ENZYME: CPT | Performed by: NURSE PRACTITIONER

## 2022-01-27 PROCEDURE — 82728 ASSAY OF FERRITIN: CPT | Performed by: NURSE PRACTITIONER

## 2022-01-27 PROCEDURE — 99233 SBSQ HOSP IP/OBS HIGH 50: CPT | Mod: ,,,

## 2022-01-27 PROCEDURE — 99239 HOSP IP/OBS DSCHRG MGMT >30: CPT | Mod: ,,, | Performed by: INTERNAL MEDICINE

## 2022-01-27 PROCEDURE — 80048 BASIC METABOLIC PNL TOTAL CA: CPT | Performed by: NURSE PRACTITIONER

## 2022-01-27 PROCEDURE — 99239 PR HOSPITAL DISCHARGE DAY,>30 MIN: ICD-10-PCS | Mod: ,,, | Performed by: INTERNAL MEDICINE

## 2022-01-27 PROCEDURE — 99233 PR SUBSEQUENT HOSPITAL CARE,LEVL III: ICD-10-PCS | Mod: ,,,

## 2022-01-27 RX ORDER — METHOCARBAMOL 500 MG/1
1000 TABLET, FILM COATED ORAL 4 TIMES DAILY
Qty: 80 TABLET | Refills: 0 | Status: SHIPPED | OUTPATIENT
Start: 2022-01-27 | End: 2022-02-06

## 2022-01-27 RX ORDER — BACLOFEN 5 MG/1
5 TABLET ORAL 3 TIMES DAILY
Qty: 15 TABLET | Refills: 0 | Status: SHIPPED | OUTPATIENT
Start: 2022-01-27 | End: 2022-07-11

## 2022-01-27 RX ORDER — GABAPENTIN 300 MG/1
900 CAPSULE ORAL 3 TIMES DAILY
Qty: 30 CAPSULE | Refills: 2 | Status: SHIPPED | OUTPATIENT
Start: 2022-01-27 | End: 2022-07-11

## 2022-01-27 RX ORDER — AMITRIPTYLINE HYDROCHLORIDE 50 MG/1
50 TABLET, FILM COATED ORAL NIGHTLY
Qty: 30 TABLET | Refills: 11 | Status: SHIPPED | OUTPATIENT
Start: 2022-01-27 | End: 2022-07-11

## 2022-01-27 RX ORDER — ACETAMINOPHEN 500 MG
1000 TABLET ORAL EVERY 8 HOURS
Qty: 20 TABLET | Refills: 0 | Status: SHIPPED | OUTPATIENT
Start: 2022-01-27

## 2022-01-27 RX ORDER — AMOXICILLIN 250 MG
1 CAPSULE ORAL DAILY
Qty: 30 TABLET | Refills: 0 | Status: SHIPPED | OUTPATIENT
Start: 2022-01-28 | End: 2022-07-11

## 2022-01-27 RX ORDER — PANTOPRAZOLE SODIUM 40 MG/1
40 TABLET, DELAYED RELEASE ORAL DAILY
Qty: 30 TABLET | Refills: 0 | Status: SHIPPED | OUTPATIENT
Start: 2022-01-28 | End: 2022-07-11

## 2022-01-27 RX ORDER — METHYLPREDNISOLONE 4 MG/1
TABLET ORAL
Qty: 21 EACH | Refills: 0 | Status: SHIPPED | OUTPATIENT
Start: 2022-01-27 | End: 2022-02-17

## 2022-01-27 RX ORDER — LIDOCAINE 50 MG/G
3 PATCH TOPICAL DAILY
Qty: 30 PATCH | Refills: 2 | Status: SHIPPED | OUTPATIENT
Start: 2022-01-27 | End: 2022-07-11

## 2022-01-27 RX ORDER — CELECOXIB 200 MG/1
200 CAPSULE ORAL DAILY
Qty: 20 CAPSULE | Refills: 0 | Status: SHIPPED | OUTPATIENT
Start: 2022-01-28 | End: 2022-07-11

## 2022-01-27 RX ADMIN — OXYCODONE HYDROCHLORIDE 10 MG: 10 TABLET ORAL at 12:01

## 2022-01-27 RX ADMIN — LISINOPRIL 5 MG: 5 TABLET ORAL at 09:01

## 2022-01-27 RX ADMIN — THERA TABS 1 TABLET: TAB at 09:01

## 2022-01-27 RX ADMIN — POLYETHYLENE GLYCOL 3350 17 G: 17 POWDER, FOR SOLUTION ORAL at 09:01

## 2022-01-27 RX ADMIN — GABAPENTIN 900 MG: 300 CAPSULE ORAL at 09:01

## 2022-01-27 RX ADMIN — OXYCODONE HYDROCHLORIDE 10 MG: 10 TABLET ORAL at 05:01

## 2022-01-27 RX ADMIN — PANTOPRAZOLE SODIUM 40 MG: 40 TABLET, DELAYED RELEASE ORAL at 09:01

## 2022-01-27 RX ADMIN — CELECOXIB 200 MG: 200 CAPSULE ORAL at 09:01

## 2022-01-27 RX ADMIN — DEXAMETHASONE 4 MG: 4 TABLET ORAL at 05:01

## 2022-01-27 RX ADMIN — METHOCARBAMOL 1000 MG: 500 TABLET ORAL at 09:01

## 2022-01-27 RX ADMIN — OXYCODONE HYDROCHLORIDE AND ACETAMINOPHEN 500 MG: 500 TABLET ORAL at 09:01

## 2022-01-27 RX ADMIN — BACLOFEN 5 MG: 5 TABLET ORAL at 09:01

## 2022-01-27 RX ADMIN — METHOCARBAMOL 1000 MG: 500 TABLET ORAL at 12:01

## 2022-01-27 RX ADMIN — DOCUSATE SODIUM 50 MG AND SENNOSIDES 8.6 MG 1 TABLET: 8.6; 5 TABLET, FILM COATED ORAL at 09:01

## 2022-01-27 RX ADMIN — LIDOCAINE 5% 3 PATCH: 700 PATCH TOPICAL at 12:01

## 2022-01-27 RX ADMIN — ACETAMINOPHEN 1000 MG: 500 TABLET ORAL at 05:01

## 2022-01-27 NOTE — PROGRESS NOTES
Damion Zheng - Neurosurgery ([facility])  Neurosurgery  Progress Note    Subjective:     History of Present Illness: Pt is 42M with chronic low back pain who complains of one day worsening acute low back pain after turning body awkwardly while getting into car. Two months ago patient was moving table with sudden increase in back pain and has had complaints of mild left big toe weakness and pain down left leg. MRI L spine shows L4-L5 mild spondylosis with herniation of disc paracentrally with caudal migration causing moderate neurforaminal stenosis L>R. NSGY reconsulted for evaluation given patient's significant pain.      Post-Op Info:  * No surgery found *         Interval History: Patient's pain significantly improved today. Denies numbness, weakness, bowel/bladder dysfunction, or saddle anesthesia. Lengthy discussion held with patient and wife at bedside going over the treatment plan. They are in agreement with nsgy follow up in clinic in 1-2 weeks to discuss conservative treatment vs surgery options.    Medications:  Continuous Infusions:  Scheduled Meds:   acetaminophen  1,000 mg Oral Q8H    amitriptyline  50 mg Oral QHS    ascorbic acid (vitamin C)  500 mg Oral BID    baclofen  5 mg Oral TID    celecoxib  200 mg Oral Daily    dexAMETHasone  4 mg Oral Q8H    enoxaparin  40 mg Subcutaneous Q24H    gabapentin  900 mg Oral TID    LIDOcaine  3 patch Transdermal Q24H    lisinopriL  5 mg Oral Daily    methocarbamoL  1,000 mg Oral QID    multivitamin  1 tablet Oral Daily    pantoprazole  40 mg Oral Daily    senna-docusate 8.6-50 mg  1 tablet Oral Daily     PRN Meds:acetaminophen, dextrose 50%, dextrose 50%, glucagon (human recombinant), glucose, glucose, melatonin, oxyCODONE, polyethylene glycol, sodium chloride 0.9%, sodium chloride 0.9%     Review of Systems   Constitutional: Negative for chills, fatigue and fever.   Eyes: Negative for photophobia and visual disturbance.   Respiratory: Negative for cough and  shortness of breath.    Cardiovascular: Negative for chest pain.   Gastrointestinal: Negative for abdominal pain, diarrhea, nausea and vomiting.   Genitourinary: Negative for decreased urine volume and difficulty urinating.   Musculoskeletal: Positive for back pain. Negative for neck pain and neck stiffness.   Neurological: Negative for facial asymmetry, weakness, numbness and headaches.     Objective:     Weight: 93 kg (205 lb)  Body mass index is 29.41 kg/m².  Vital Signs (Most Recent):  Temp: 98 °F (36.7 °C) (01/27/22 0845)  Pulse: 80 (01/27/22 0845)  Resp: 18 (01/27/22 0845)  BP: 116/75 (01/27/22 0845)  SpO2: 96 % (01/27/22 0845) Vital Signs (24h Range):  Temp:  [97.2 °F (36.2 °C)-98.6 °F (37 °C)] 98 °F (36.7 °C)  Pulse:  [] 80  Resp:  [16-18] 18  SpO2:  [92 %-96 %] 96 %  BP: (116-144)/(65-89) 116/75     Date 01/27/22 0700 - 01/28/22 0659   Shift 0720-7803 5448-7112 7050-9136 24 Hour Total   INTAKE   Shift Total(mL/kg)       OUTPUT   Urine(mL/kg/hr) 300   300   Shift Total(mL/kg) 300(3.2)   300(3.2)   Weight (kg) 93 93 93 93     Neurosurgery Physical Exam  General: Well developed, well nourished, not in acute distress.   Head: Normocephalic, atraumatic.  Neck: No TTP. Full ROM.   Neurologic: Alert and oriented x 4. Thought content appropriate.  GCS: Motor:6  Verbal:5  Eyes:4   GCS Total: 15  Language: No aphasia.  Speech: No dysarthria.  Cranial nerves: Face symmetric, tongue midline, CN II-XII grossly intact.   Eyes: Pupils equal, round, reactive to light with accomodation, EOMI.   Pulmonary: Normal respirations, no signs of respiratory distress.  Abdomen: Soft, non-distended, non-tender to palpation.  Sensory: Decreased sensation to L upper anterior and posterior thigh down to the ankle.  Motor Strength: Moves all extremities spontaneously with good tone. No abnormal movements seen. Full strength.     Strength   Deltoids Triceps Biceps Wrist Extension Wrist Flexion Hand    Upper: R 5/5 5/5 5/5 5/5  5/5 5/5     L 5/5 5/5 5/5 5/5 5/5 5/5       Hip Flexion Knee Extension Knee  Flexion Dorsiflexion Plantar flexion EHL   Lower: R 5/5 5/5 5/5 5/5 5/5 5/5     L 5/5 5/5 5/5 5/5 5/5 5/5     Skin: Skin is warm, dry and intact.         Significant Labs:  Recent Labs   Lab 01/27/22  0655         K 4.8      CO2 26   BUN 24*   CREATININE 0.9   CALCIUM 9.4   MG 2.0     Recent Labs   Lab 01/27/22  0655   WBC 14.54*   HGB 17.1   HCT 51.5        No results for input(s): LABPT, INR, APTT in the last 48 hours.  Microbiology Results (last 7 days)     ** No results found for the last 168 hours. **        All pertinent labs from the last 24 hours have been reviewed.    Significant Diagnostics:  I have reviewed and interpreted all pertinent imaging results/findings within the past 24 hours.    Assessment/Plan:     * Other spondylosis with radiculopathy, lumbar region  Pt is 42M with chronic low back pain who complains of one day worsening acute low back pain after turning body awkwardly while getting into car. Two months ago patient was oving table with sudden increase in back pain and has had complaints of mild left big toe weakness and pain down left leg. MRI L spine shows L4-L5 mild spondylosis with herniation of disc paracentrally with caudal migration causing moderate neurforaminal stenosis L>R.     NSGY reconsulted to evaluate for significant, continued pain.    Plan:  -Admitted to  floor status.  -Acute pain management following for pain control. Recommend decadron 10 mg IV once with continued pain control.  -Imaging reviewed: MRI L spine shows L4-L5 mild spondylosis with herniation of disc paracentrally with caudal migration causing moderate neurforaminal stenosis L>R.  -A lengthy discussion was held with the patient and wife at bedside. Patient with improved pain. He is neurologically stable and without deficits. No emergent neurosurgical intervention is indicated at this time. The patient and  wife voiced understanding and have agreed with the plan to follow up with neurosurgery in clinic to discuss treatment options.  -Recommend left L4-L5 JENNIFER upon discharge with established provider, given patient has had relief of pain in the past with JENNIFER.        Becca Oconnor PA-C  Neurosurgery  Dam[facility] Zheng - Neurosurgery ([facility])

## 2022-01-27 NOTE — DISCHARGE SUMMARY
Damion Zheng - Neurosurgery ([facility])  [facility] Medicine  Discharge Summary      Patient Name: Varun Graham Sr.  MRN: [id number]  Patient Class: IP- Inpatient  Admission Date: 1/22/2022  Hospital Length of Stay: 2 days  Discharge Date and Time:  01/27/2022   Attending Physician: Rubin Frausto MD   Discharging Provider: Rubin Frausto MD  Primary Care Provider: Lamont Venegas MD  [facility] Medicine Team: [facility]OS[facility] MED R Rubin Frausto MD    HPI:   Varun Graham Sr. Is a  42 y.o. male with a PMHx of HTN and chronic lower back pain who transferred from [facility] for MRI lumbar spine and neurosurgery evaluation. The patient reports lower back pain beginning in November that worsened a few weeks ago. He reports being seen by [facility] and PT and MRI spine were recommended at that time. He was unable to get insurance approval for MRI due to not completing physical therapy prior. He received a steroid injection on Wednesday of last week and reports feeling great after and being pain free for several days. He states that Friday evening as he was walking from his truck he began experiencing 10/10 shooting pain. He describes the the pain as shooting pain to the left side of his lower back radiating down his left leg from his groin to heel. He endorses decreased sensation to his left foot which is unchanged. He also endorses intermittent muscle spasms in his lower back as well. He reports the pain has been so severe that he is unable to walk at this point. He states certain positions make the pain worse and the steroid injection was the only thing that provided relief. He has tried gabapentin, robaxin, zanaflex, percocet and prednisone with limited benefit.The patient denies any bowel or bladder incontinence or saddle anesthesia. The patient denies any fever, chills, shortness of breath, cough, chest pain, abdominal pain, dizziness, lightheadedness, or dysuria.    In the ED, patient hypertensive  and mildly tachypneic in the ED, pain likely contributing. Otherwise VSSAF. CBC and CMP unremarkable. CXR with no acute process. COVID-19 positive. MRI lumbar spine with lower lumbar spondylosis with L4-5 left paracentral inferior disc extrusion extending 1.0 cm caudal to the L5 superior endplate resulting in mild spinal canal narrowing and bilateral moderate neural foraminal narrowing.  Extrusion compresses the exiting L4 nerve and closely approximates the descending L5 nerve root which likely contributes to patient's reported symptoms. NSGY initially accepted the patient for transfer but reviewed imaging and recommended no surgical intervention and observation under hospital medicine for pain control and PT/OT.      * No surgery found *      Hospital Course:   42 y.o. male with a PMHx of HTN and chronic lower back pain who transferred from [facility] for MRI lumbar spine and neurosurgery evaluation. MRI lumbar spine with lower lumbar spondylosis with L4-5 left paracentral inferior disc extrusion extending 1.0 cm caudal to the L5 superior endplate resulting in mild spinal canal narrowing and bilateral moderate neural foraminal narrowing (as per read). Extrusion compresses the exiting L4 nerve and closely approximates the descending L5 nerve root. NSGY consulted, no acute interventions. Pain mgmt recommended. He has had no relief despite several scheduled muscle relaxants, pain management and narcotics. Dicussed LSO brace with NSGY, but they recommend against this as this may worsen his condition. Acute pain consulted as he continues to have 10/10 pain, they added few other multimodal pain meds but no change in pain. NSGY and Acute pain recs neuro IR for JENNIFER which can improve his symptoms then hopefully outpatient surgery. His pain fortunately improved. Plan for dc home today with NSGY referral.     He is not vaccinated and refused vaccination.    He was asked to isolate for total 10 days. Asymptomatic incidental  COVID.         Goals of Care Treatment Preferences:  Code Status: Full Code      Consults:   Consults (From admission, onward)        Status Ordering Provider     Inpatient consult to Interventional Radiology  Once        Provider:  (Not yet assigned)    Acknowledged RIP HERMAN     Inpatient consult to Pain Management  Once        Provider:  (Not yet assigned)    Completed RIP HERMAN     Inpatient consult to Neurosurgery  Once        Provider:  (Not yet assigned)    Completed GEORGE ALEX          No new Assessment & Plan notes have been filed under this hospital service since the last note was generated.  Service: Hospital Medicine    Final Active Diagnoses:    Diagnosis Date Noted POA    PRINCIPAL PROBLEM:  Other spondylosis with radiculopathy, lumbar region [M47.26] 01/23/2022 Yes    COVID-19 virus infection [U07.1] 01/23/2022 Yes    HTN (hypertension) [I10] 01/23/2022 Yes      Problems Resolved During this Admission:       Discharged Condition: stable    Disposition: Home or Self Care    Follow Up:   Follow-up Information     Lamont Venegas MD. Go on 1/27/2022.    Specialty: Family Medicine  Why: Hospital follow up, 1:30 pm  Contact information:  [address]  [address]  [place] [address]  [phone]             [facility] - Neurosurgery [facility].    Specialty: Neurosurgery  Contact information:  [facility] [address]  [facility] [address] [phone]  Additional information:  8th Floor [facility] [place]   Please park in [facility].   Check in desk is located in the lobby. Please take the C elevator to 8th floor which opens to the lobby.           Lamont Venegas MD. Schedule an appointment as soon as possible for a visit in 1 week.    Specialty: Family Medicine  Why: post hospital follow up  Contact information:  [address]  [address]  [place] [address]  [phone]                       Patient Instructions:      WALKER FOR HOME USE     Order  "Specific Question Answer Comments   Type of Walker: Adult (5'4"-6'6")    With wheels? Yes    Height: 5' 10" (1.778 m)    Weight: 93 kg (205 lb)    Length of need (1-99 months): 99    Does patient have medical equipment at home? none    Please check all that apply: Patient's condition impairs ambulation.    Please check all that apply: Patient is unable to safely ambulate without equipment.    Please check all that apply: Patient needs help to get in and out of chair.      COMMODE FOR HOME USE     Order Specific Question Answer Comments   Type: Standard    Height: 5' 10" (1.778 m)    Weight: 93 kg (205 lb)    Does patient have medical equipment at home? none    Length of need (1-99 months): 99      WALKER FOR HOME USE     Order Specific Question Answer Comments   Type of Walker: Adult (5'4"-6'6")    With wheels? Yes    Height: 5' 10" (1.778 m)    Weight: 93 kg (205 lb)    Length of need (1-99 months): 99    Does patient have medical equipment at home? none    Please check all that apply: Patient's condition impairs ambulation.    Please check all that apply: Patient is unable to safely ambulate without equipment.    Please check all that apply: Patient needs help to get in and out of chair.    Please check all that apply: Altered sensory perception.    Please check all that apply: Walker will be used for gait training.      COMMODE FOR HOME USE     Order Specific Question Answer Comments   Type: Standard    Height: 5' 10" (1.778 m)    Weight: 93 kg (205 lb)    Does patient have medical equipment at home? none    Length of need (1-99 months): 99      Ambulatory referral/consult to Neurosurgery   Standing Status: Future   Referral Priority: Urgent Referral Type: Consultation   Referral Reason: Specialty Services Required   Requested Specialty: Neurosurgery   Number of Visits Requested: 1     COVID-19 Surveillance Program     Order Specific Question Answer Comments   Does patient have a smartphone? Yes    Does patient " have the [facility] cordell on their smartphone? Yes    While in surveillance program, will patient be using home oxygen? No        Significant Diagnostic Studies: all reviewed    Pending Diagnostic Studies:     None         Medications:  Reconciled Home Medications:      Medication List      START taking these medications    acetaminophen 500 MG tablet  Commonly known as: TYLENOL  Take 2 tablets (1,000 mg total) by mouth every 8 (eight) hours.     amitriptyline 50 MG tablet  Commonly known as: ELAVIL  Take 1 tablet (50 mg total) by mouth every evening.     baclofen 5 mg Tab tablet  Commonly known as: LIORESAL  Take 1 tablet (5 mg total) by mouth 3 (three) times daily.     celecoxib 200 MG capsule  Commonly known as: CeleBREX  Take 1 capsule (200 mg total) by mouth once daily.  Start taking on: January 28, 2022     methocarbamoL 500 MG Tab  Commonly known as: ROBAXIN  Take 2 tablets (1,000 mg total) by mouth 4 (four) times daily for 10 days     methylPREDNISolone 4 mg tablet  Commonly known as: MEDROL DOSEPACK  use as directed     pantoprazole 40 MG tablet  Commonly known as: PROTONIX  Take 1 tablet (40 mg total) by mouth once daily.  Start taking on: January 28, 2022     pulse oximeter device  Commonly known as: pulse oximeter  by Apply Externally route 2 (two) times a day. Use twice daily at 8 AM and 3 PM and record the value [facility] [employer] as directed.     STOOL SOFTENER-LAXATIVE 8.6-50 mg per tablet  Generic drug: senna-docusate 8.6-50 mg  Take 1 tablet by mouth once daily.  Start taking on: January 28, 2022        CHANGE how you take these medications    gabapentin 300 MG capsule  Commonly known as: NEURONTIN  Take 3 capsules (900 mg total) by mouth 3 (three) times daily.  What changed: how much to take     LIDOcaine 5 %  Commonly known as: LIDODERM  Place 3 patches onto the skin once daily. Remove & Discard patch within 12 hours or as directed by MD  What changed: how much to take        STOP taking these medications     amLODIPine 5 MG tablet  Commonly known as: NORVASC     HYDROcodone-acetaminophen 5-325 mg per tablet  Commonly known as: NORCO     ketorolac 10 mg tablet  Commonly known as: TORADOL     meloxicam 15 MG tablet  Commonly known as: MOBIC     oxyCODONE-acetaminophen 5-325 mg per tablet  Commonly known as: PERCOCET     predniSONE 20 MG tablet  Commonly known as: DELTASONE            Indwelling Lines/Drains at time of discharge:   Lines/Drains/Airways     None                 Time spent on the discharge of patient: > 30 minutes         Rubin Frausot MD  Department of Hospital Medicine  [facility] - Neurosurgery ([facility])

## 2022-01-27 NOTE — SUBJECTIVE & OBJECTIVE
Interval History: Patient's pain significantly improved today. Denies numbness, weakness, bowel/bladder dysfunction, or saddle anesthesia. Lengthy discussion held with patient and wife at bedside going over the treatment plan. They are in agreement with nsgy follow up in clinic in 1-2 weeks to discuss conservative treatment vs surgery options.    Medications:  Continuous Infusions:  Scheduled Meds:   acetaminophen  1,000 mg Oral Q8H    amitriptyline  50 mg Oral QHS    ascorbic acid (vitamin C)  500 mg Oral BID    baclofen  5 mg Oral TID    celecoxib  200 mg Oral Daily    dexAMETHasone  4 mg Oral Q8H    enoxaparin  40 mg Subcutaneous Q24H    gabapentin  900 mg Oral TID    LIDOcaine  3 patch Transdermal Q24H    lisinopriL  5 mg Oral Daily    methocarbamoL  1,000 mg Oral QID    multivitamin  1 tablet Oral Daily    pantoprazole  40 mg Oral Daily    senna-docusate 8.6-50 mg  1 tablet Oral Daily     PRN Meds:acetaminophen, dextrose 50%, dextrose 50%, glucagon (human recombinant), glucose, glucose, melatonin, oxyCODONE, polyethylene glycol, sodium chloride 0.9%, sodium chloride 0.9%     Review of Systems   Constitutional: Negative for chills, fatigue and fever.   Eyes: Negative for photophobia and visual disturbance.   Respiratory: Negative for cough and shortness of breath.    Cardiovascular: Negative for chest pain.   Gastrointestinal: Negative for abdominal pain, diarrhea, nausea and vomiting.   Genitourinary: Negative for decreased urine volume and difficulty urinating.   Musculoskeletal: Positive for back pain. Negative for neck pain and neck stiffness.   Neurological: Negative for facial asymmetry, weakness, numbness and headaches.     Objective:     Weight: 93 kg (205 lb)  Body mass index is 29.41 kg/m².  Vital Signs (Most Recent):  Temp: 98 °F (36.7 °C) (01/27/22 0845)  Pulse: 80 (01/27/22 0845)  Resp: 18 (01/27/22 0845)  BP: 116/75 (01/27/22 0845)  SpO2: 96 % (01/27/22 0845) Vital Signs (24h  Range):  Temp:  [97.2 °F (36.2 °C)-98.6 °F (37 °C)] 98 °F (36.7 °C)  Pulse:  [] 80  Resp:  [16-18] 18  SpO2:  [92 %-96 %] 96 %  BP: (116-144)/(65-89) 116/75     Date 01/27/22 0700 - 01/28/22 0659   Shift 6955-3548 5326-2027 6153-1314 24 Hour Total   INTAKE   Shift Total(mL/kg)       OUTPUT   Urine(mL/kg/hr) 300   300   Shift Total(mL/kg) 300(3.2)   300(3.2)   Weight (kg) 93 93 93 93     Neurosurgery Physical Exam  General: Well developed, well nourished, not in acute distress.   Head: Normocephalic, atraumatic.  Neck: No TTP. Full ROM.   Neurologic: Alert and oriented x 4. Thought content appropriate.  GCS: Motor:6  Verbal:5  Eyes:4   GCS Total: 15  Language: No aphasia.  Speech: No dysarthria.  Cranial nerves: Face symmetric, tongue midline, CN II-XII grossly intact.   Eyes: Pupils equal, round, reactive to light with accomodation, EOMI.   Pulmonary: Normal respirations, no signs of respiratory distress.  Abdomen: Soft, non-distended, non-tender to palpation.  Sensory: Decreased sensation to L upper anterior and posterior thigh down to the ankle.  Motor Strength: Moves all extremities spontaneously with good tone. No abnormal movements seen. Full strength.     Strength   Deltoids Triceps Biceps Wrist Extension Wrist Flexion Hand    Upper: R 5/5 5/5 5/5 5/5 5/5 5/5     L 5/5 5/5 5/5 5/5 5/5 5/5       Hip Flexion Knee Extension Knee  Flexion Dorsiflexion Plantar flexion EHL   Lower: R 5/5 5/5 5/5 5/5 5/5 5/5     L 5/5 5/5 5/5 5/5 5/5 5/5     Skin: Skin is warm, dry and intact.         Significant Labs:  Recent Labs   Lab 01/27/22  0655         K 4.8      CO2 26   BUN 24*   CREATININE 0.9   CALCIUM 9.4   MG 2.0     Recent Labs   Lab 01/27/22  0655   WBC 14.54*   HGB 17.1   HCT 51.5        No results for input(s): LABPT, INR, APTT in the last 48 hours.  Microbiology Results (last 7 days)     ** No results found for the last 168 hours. **        All pertinent labs from the last 24  hours have been reviewed.    Significant Diagnostics:  I have reviewed and interpreted all pertinent imaging results/findings within the past 24 hours.

## 2022-01-27 NOTE — PLAN OF CARE
[facility])  Discharge Final Note    Primary Care Provider: Lamont Venegas MD  Expected Discharge Date: 1/27/2022  Patient medically ready for discharge to home today.  Hospital follow up scheduled, 2/2 with pt's PCP Dr. Venegas.  Walker and bedside commode were delivered at bedside.    Final Discharge Note (most recent)     Final Note - 01/27/22 1316        Final Note    Assessment Type Final Discharge Note     Anticipated Discharge Disposition Home or Self Care     What phone number can be called within the next 1-3 days to see how you are doing after discharge? [phone]     Hospital Resources/Appts/Education Provided Provided patient/caregiver with written discharge plan information;Provided education on problems/symptoms using teachback;Appointments scheduled and added to AVS        Post-Acute Status    Post-Acute Authorization Other     Other Status No Post-Acute Service Needs     Discharge Delays None known at this time               Important Message from Medicare         Referral Info (most recent)     Referral Info    No documentation.               Contact Info     Lamont Venegas MD   Specialty: Family Medicine   Relationship: PCP - General    [address]  [address]  [place] [address]   Phone: [phone]       Next Steps: Go on 1/27/2022    Instructions: Hospital follow up, 1:30 pm    [facility] [address] [facility]   Specialty: Neurosurgery    [address]  [facility] [id number]   Phone: [phone]       Next Steps: Follow up    Lamont Venegas MD   Specialty: Family Medicine   Relationship: PCP - General    [address]  [address]  [place] [address]   Phone: [phone]       Next Steps: Schedule an appointment as soon as possible for a visit in 1 week(s)    Instructions: post hospital follow up        Future Appointments   Date Time Provider Department Center   2/2/2022 10:30 AM Lamont Venegas MD [facility]  Clin   2/16/2022 11:00 AM Gita Michaels PA-C [facility] NEUROS8 [facility]     Laura Moreno MSW  Case Management  Ext: [phone]  01/27/2022

## 2022-01-27 NOTE — TELEPHONE ENCOUNTER
----- Message from ALEXANDER Wyatt sent at 1/27/2022 10:25 AM CST -----  Regarding: hospital follow up  Hello,    This pt is currently admitted to [facility] with an estimated discharge date of today, 1/27 (originally scheduled for 1/24).  Pt is in need of a hospital follow-up within 7 days of discharge (preferably at the beginning of the week of 1/31 if possible).  Pt can be reached at [phone] to schedule.      Thanks!  DONTA Sanchez [phone]       no

## 2022-01-27 NOTE — PLAN OF CARE
"Plan of care reviewed with the patient and his wife.They both verbalized their understanding.The patient's pain intensifies when he attempts to get out of the bed to walk or sit in a chair. Comfort measures are being provided and the patient is being medicated with his current pain regimen. The patient states " I am good as long as I lay in the bed, but my pain starts all over again when I attempt to walk or get out of the bed. The patient is laying in the bed with the HOB flat at the present time. No s/s of any respiratory distress noted. The patient is on Isolation and Contact precautions because of his positive Covid diagnosis. The bed is locked and in it's lowest position.The call light is within reach. The patient is Alert and Oriented X4. The patient had a bowel movement this evening.No s/s of an adverse reaction noted to medication therapy. Will continue to monitor and report any changes     "

## 2022-01-27 NOTE — PLAN OF CARE
Problem: Adult Inpatient Plan of Care  Goal: Plan of Care Review  Outcome: Ongoing, Progressing  Goal: Patient-Specific Goal (Individualized)  Outcome: Ongoing, Progressing  Goal: Absence of Hospital-Acquired Illness or Injury  Outcome: Ongoing, Progressing  Goal: Optimal Comfort and Wellbeing  Outcome: Ongoing, Progressing  Goal: Readiness for Transition of Care  Outcome: Ongoing, Progressing     Problem: Skin Injury Risk Increased  Goal: Skin Health and Integrity  Outcome: Ongoing, Progressing     Problem: Fall Injury Risk  Goal: Absence of Fall and Fall-Related Injury  Outcome: Ongoing, Progressing     Problem: Pain Acute  Goal: Acceptable Pain Control and Functional Ability  Outcome: Ongoing, Progressing

## 2022-01-27 NOTE — NURSING
Discharge instructions were given to the patient and his wife and they both verbalized their understanding. The IV was discontinued as ordered. The patient received a walker, his new discharge medications, and a bedside commode prior to being discharged. No s/s of any respiratory distress noted. The patient was transported wearing a face mask  from the unit via wheelchair to a personal autombile without any apparent distress noted..

## 2022-01-27 NOTE — PLAN OF CARE
Plan of care reviewed with the patient and his wife.They both verbalized their understanding. The patient states that his pain is decreasing. The patient's pain intensifies when he attempts to get out of the bed to walk or sit in a chair. Comfort measures are being provided and the patient is being medicated with his current pain regimen. The patient is laying in the bed with the HOB flat at the present time. No s/s of any respiratory distress noted. The patient is on Isolation and Contact precautions because of his positive Covid diagnosis. The bed is locked and in it's lowest position.The call light is within reach. The patient is Alert and Oriented X4. The patient is calm and cooperative with the staff at the present time.No s/s of an adverse reaction noted to medication therapy. Will continue to monitor and report any changes

## 2022-01-28 ENCOUNTER — TELEPHONE (OUTPATIENT)
Dept: ADMINISTRATIVE | Facility: CLINIC | Age: 42
End: 2022-01-28
Payer: COMMERCIAL

## 2022-05-05 ENCOUNTER — PATIENT MESSAGE (OUTPATIENT)
Dept: SMOKING CESSATION | Facility: CLINIC | Age: 42
End: 2022-05-05
Payer: COMMERCIAL

## 2022-07-11 ENCOUNTER — OFFICE VISIT (OUTPATIENT)
Dept: PRIMARY CARE CLINIC | Facility: CLINIC | Age: 42
End: 2022-07-11
Payer: COMMERCIAL

## 2022-07-11 VITALS
OXYGEN SATURATION: 97 % | DIASTOLIC BLOOD PRESSURE: 80 MMHG | WEIGHT: 213.19 LBS | BODY MASS INDEX: 30.52 KG/M2 | TEMPERATURE: 97 F | RESPIRATION RATE: 16 BRPM | HEART RATE: 90 BPM | SYSTOLIC BLOOD PRESSURE: 116 MMHG | HEIGHT: 70 IN

## 2022-07-11 DIAGNOSIS — Z13.6 ENCOUNTER FOR SCREENING FOR CARDIOVASCULAR DISORDERS: ICD-10-CM

## 2022-07-11 DIAGNOSIS — I10 HYPERTENSION, UNSPECIFIED TYPE: Primary | ICD-10-CM

## 2022-07-11 DIAGNOSIS — Z00.00 ROUTINE PHYSICAL EXAMINATION: ICD-10-CM

## 2022-07-11 PROCEDURE — 99999 PR PBB SHADOW E&M-EST. PATIENT-LVL III: ICD-10-PCS | Mod: PBBFAC,,, | Performed by: NURSE PRACTITIONER

## 2022-07-11 PROCEDURE — 3008F PR BODY MASS INDEX (BMI) DOCUMENTED: ICD-10-PCS | Mod: CPTII,S$GLB,, | Performed by: NURSE PRACTITIONER

## 2022-07-11 PROCEDURE — 3079F PR MOST RECENT DIASTOLIC BLOOD PRESSURE 80-89 MM HG: ICD-10-PCS | Mod: CPTII,S$GLB,, | Performed by: NURSE PRACTITIONER

## 2022-07-11 PROCEDURE — 99999 PR PBB SHADOW E&M-EST. PATIENT-LVL III: CPT | Mod: PBBFAC,,, | Performed by: NURSE PRACTITIONER

## 2022-07-11 PROCEDURE — 3074F SYST BP LT 130 MM HG: CPT | Mod: CPTII,S$GLB,, | Performed by: NURSE PRACTITIONER

## 2022-07-11 PROCEDURE — 99214 OFFICE O/P EST MOD 30 MIN: CPT | Mod: S$GLB,,, | Performed by: NURSE PRACTITIONER

## 2022-07-11 PROCEDURE — 3074F PR MOST RECENT SYSTOLIC BLOOD PRESSURE < 130 MM HG: ICD-10-PCS | Mod: CPTII,S$GLB,, | Performed by: NURSE PRACTITIONER

## 2022-07-11 PROCEDURE — 3079F DIAST BP 80-89 MM HG: CPT | Mod: CPTII,S$GLB,, | Performed by: NURSE PRACTITIONER

## 2022-07-11 PROCEDURE — 3008F BODY MASS INDEX DOCD: CPT | Mod: CPTII,S$GLB,, | Performed by: NURSE PRACTITIONER

## 2022-07-11 PROCEDURE — 99214 PR OFFICE/OUTPT VISIT, EST, LEVL IV, 30-39 MIN: ICD-10-PCS | Mod: S$GLB,,, | Performed by: NURSE PRACTITIONER

## 2022-07-11 RX ORDER — IBUPROFEN 800 MG/1
800 TABLET ORAL EVERY 6 HOURS PRN
COMMUNITY
Start: 2022-05-31

## 2022-07-11 RX ORDER — AMLODIPINE BESYLATE 5 MG/1
2.5 TABLET ORAL DAILY
COMMUNITY

## 2022-07-11 NOTE — PROGRESS NOTES
Chief Complaint  Chief Complaint   Patient presents with    Hypertension       HPI    With history of hypertension has been taking 2.5 mg of amlodipine on and off for several weeks.  States that he occasionally has a headache and takes and amlodipine but then often feels weak and dizzy after taking the medication.  Currently checking his blood pressure at home and presents with a blood pressure log ranging in the 120s over 60s to 150s over 100. Patient taking blood pressures periodically through the day.  Tends to run high after working in the [employer], drinking coffee.  Generally runs normal when he is at rest.  Presents clinic today with normal blood pressure although he did take amlodipine yesterday.  Current smoker.  Does drink excessive caffeine which may affect his blood pressure.  Works out in the heat exercises regularly without any chest pain or palpitations.  Lipid panel on file in 2018 within normal limits, due for repeat lab work at this time.    Lab Results   Component Value Date    WBC 14.54 (H) 01/27/2022    HGB 17.1 01/27/2022    HCT 51.5 01/27/2022     01/27/2022    CHOL 170 07/26/2018    TRIG 162 (H) 07/26/2018    HDL 46 07/26/2018    ALT 23 01/24/2022    AST 12 01/24/2022     01/27/2022    K 4.8 01/27/2022     01/27/2022    CREATININE 0.9 01/27/2022    BUN 24 (H) 01/27/2022    CO2 26 01/27/2022    INR 0.9 01/23/2022           PAST MEDICAL HISTORY:  Past Medical History:   Diagnosis Date    Chronic back pain        PAST SURGICAL HISTORY:  Past Surgical History:   Procedure Laterality Date    APPENDECTOMY  1996       SOCIAL HISTORY:  Social History     Socioeconomic History    Marital status:    Tobacco Use    Smoking status: Current Every Day Smoker     Packs/day: 1.00     Types: Cigarettes    Smokeless tobacco: Never Used   Substance and Sexual Activity    Alcohol use: No    Drug use: No    Sexual activity: Yes     Partners: Female       FAMILY HISTORY:  Family History  "  Problem Relation Age of Onset    Diabetes Father     Heart attack Paternal Grandfather 67    Stroke Neg Hx     Colon cancer Neg Hx     Lung cancer Neg Hx     Prostate cancer Neg Hx        ALLERGIES AND MEDICATIONS: updated and reviewed.  Review of patient's allergies indicates:  No Known Allergies  Current Outpatient Medications   Medication Sig Dispense Refill    acetaminophen (TYLENOL) 500 MG tablet Take 2 tablets (1,000 mg total) by mouth every 8 (eight) hours. 20 tablet 0    amLODIPine (NORVASC) 5 MG tablet Take 2.5 mg by mouth once daily.      ibuprofen (ADVIL,MOTRIN) 800 MG tablet Take 800 mg by mouth every 6 (six) hours as needed.       No current facility-administered medications for this visit.         ROS  Review of Systems   Constitutional: Negative for chills and fever.   HENT: Negative for ear pain, postnasal drip and sinus pain.    Respiratory: Negative for cough and shortness of breath.    Cardiovascular: Negative for chest pain.   Gastrointestinal: Negative for diarrhea, nausea and vomiting.   Neurological: Positive for dizziness and headaches.           PHYSICAL EXAM  Vitals:    07/11/22 1431   BP: 116/80   BP Location: Left arm   Patient Position: Sitting   BP Method: Medium (Automatic)   Pulse: 90   Resp: 16   Temp: 97.1 °F (36.2 °C)   TempSrc: Temporal   SpO2: 97%   Weight: 96.7 kg (213 lb 3 oz)   Height: 5' 10" (1.778 m)    Body mass index is 30.59 kg/m².  Weight: 96.7 kg (213 lb 3 oz)   Height: 5' 10" (177.8 cm)     Physical Exam  Constitutional:       Appearance: He is well-developed.   HENT:      Head: Normocephalic.      Right Ear: Tympanic membrane normal.      Left Ear: Tympanic membrane normal.      Mouth/Throat:      Pharynx: Uvula midline.   Eyes:      Conjunctiva/sclera: Conjunctivae normal.   Cardiovascular:      Rate and Rhythm: Normal rate and regular rhythm.      Pulses: Normal pulses.           Radial pulses are 2+ on the right side and 2+ on the left side.      Heart " sounds: Normal heart sounds. No murmur heard.     Comments: No LE swelling noted  Pulmonary:      Effort: Pulmonary effort is normal.      Breath sounds: Normal breath sounds. No wheezing.   Abdominal:      General: Bowel sounds are normal.      Palpations: Abdomen is soft.      Tenderness: There is no abdominal tenderness.   Lymphadenopathy:      Cervical: No cervical adenopathy.   Skin:     General: Skin is warm and dry.      Findings: No rash.   Neurological:      Mental Status: He is alert and oriented to person, place, and time.           Health Maintenance       Date Due Completion Date    Hepatitis C Screening Never done ---    COVID-19 Vaccine (1) Never done ---    HIV Screening Never done ---    TETANUS VACCINE 07/11/2023 (Originally 1/14/1996) ---    Pneumococcal Vaccines (Age 0-64) (1 - PCV) 07/11/2023 (Originally 1/14/1984) ---    Influenza Vaccine (1) 09/01/2022 ---    Lipid Panel 07/26/2023 7/26/2018            Assessment & Plan    Problem List Items Addressed This Visit        Unprioritized    HTN (hypertension)  Okay to hold off on amlodipine. Check blood pressues at rest only. Record first thing in the am consistently off of medication and send me a message in the portal letting me know how you are running.         Other Visit Diagnoses     Encounter for screening for cardiovascular disorders    -  Primary    Relevant Orders    Lipid Panel    Routine physical examination        Relevant Orders    CBC Auto Differential    Comprehensive Metabolic Panel    TSH    HIV 1/2 Ag/Ab (4th Gen)    Hepatitis C Antibody        Repeat blood work. Has been ordered.     Follow-up: Follow up in about 2 weeks (around 7/25/2022) for blood pressure check.    Elyse Han    Medication List with Changes/Refills   Current Medications    ACETAMINOPHEN (TYLENOL) 500 MG TABLET    Take 2 tablets (1,000 mg total) by mouth every 8 (eight) hours.    AMLODIPINE (NORVASC) 5 MG TABLET    Take 2.5 mg by mouth once daily.     IBUPROFEN (ADVIL,MOTRIN) 800 MG TABLET    Take 800 mg by mouth every 6 (six) hours as needed.   Discontinued Medications    AMITRIPTYLINE (ELAVIL) 50 MG TABLET    Take 1 tablet (50 mg total) by mouth every evening.    BACLOFEN (LIORESAL) 5 MG TAB TABLET    Take 1 tablet (5 mg total) by mouth 3 (three) times daily.    CELECOXIB (CELEBREX) 200 MG CAPSULE    Take 1 capsule (200 mg total) by mouth once daily.    GABAPENTIN (NEURONTIN) 300 MG CAPSULE    Take 3 capsules (900 mg total) by mouth 3 (three) times daily.    LIDOCAINE (LIDODERM) 5 %    Place 3 patches onto the skin once daily. Remove & Discard patch within 12 hours or as directed by MD    PANTOPRAZOLE (PROTONIX) 40 MG TABLET    Take 1 tablet (40 mg total) by mouth once daily.    PULSE OXIMETER (PULSE OXIMETER) DEVICE    by Apply Externally route 2 (two) times a day. Use twice daily at 8 AM and 3 PM and [facility].    SENNA-DOCUSATE 8.6-50 MG (PERICOLACE) 8.6-50 MG PER TABLET    Take 1 tablet by mouth once daily.

## 2023-04-21 ENCOUNTER — PATIENT MESSAGE (OUTPATIENT)
Dept: ADMINISTRATIVE | Facility: HOSPITAL | Age: 43
End: 2023-04-21
Payer: COMMERCIAL

## 2023-09-15 ENCOUNTER — PATIENT MESSAGE (OUTPATIENT)
Dept: ADMINISTRATIVE | Facility: HOSPITAL | Age: 43
End: 2023-09-15
Payer: COMMERCIAL

## 2023-09-28 ENCOUNTER — PATIENT OUTREACH (OUTPATIENT)
Dept: ADMINISTRATIVE | Facility: HOSPITAL | Age: 43
End: 2023-09-28
Payer: COMMERCIAL

## 2023-09-28 NOTE — PROGRESS NOTES
Health Maintenance Due   Topic Date Due    Hepatitis C Screening  Never done    COVID-19 Vaccine (1) Never done    Pneumococcal Vaccines (Age 0-64) (1 - PCV) Never done    HIV Screening  Never done    TETANUS VACCINE  Never done    Hemoglobin A1c (Diabetic Prevention Screening)  Never done    Colorectal Cancer Screening  Never done    Lipid Panel  07/26/2023    Influenza Vaccine (1) Never done     Immunizations - reviewed and updated   Care Everywhere - triggered   Care Teams - updated   Outreach - Spoke with patient in regards to overdue colon cancer screening, patient states he is working out of town right now and not sure how long the job will be. He will call back to discuss which option is better for him. Verbalized understanding.

## 2023-11-30 ENCOUNTER — TELEPHONE (OUTPATIENT)
Dept: PRIMARY CARE CLINIC | Facility: CLINIC | Age: 43
End: 2023-11-30
Payer: COMMERCIAL

## 2023-11-30 VITALS — DIASTOLIC BLOOD PRESSURE: 70 MMHG | SYSTOLIC BLOOD PRESSURE: 116 MMHG

## 2024-09-19 ENCOUNTER — PATIENT MESSAGE (OUTPATIENT)
Dept: PRIMARY CARE CLINIC | Facility: CLINIC | Age: 44
End: 2024-09-19
Payer: COMMERCIAL